# Patient Record
Sex: FEMALE | Race: WHITE | NOT HISPANIC OR LATINO | Employment: FULL TIME | URBAN - METROPOLITAN AREA
[De-identification: names, ages, dates, MRNs, and addresses within clinical notes are randomized per-mention and may not be internally consistent; named-entity substitution may affect disease eponyms.]

---

## 2018-01-11 ENCOUNTER — CONVERSION ENCOUNTER (OUTPATIENT)
Dept: GENERAL RADIOLOGY | Facility: HOSPITAL | Age: 53
End: 2018-01-11

## 2018-05-31 ENCOUNTER — OFFICE VISIT CONVERTED (OUTPATIENT)
Dept: FAMILY MEDICINE CLINIC | Age: 53
End: 2018-05-31
Attending: NURSE PRACTITIONER

## 2019-03-27 ENCOUNTER — HOSPITAL ENCOUNTER (OUTPATIENT)
Dept: GENERAL RADIOLOGY | Facility: HOSPITAL | Age: 54
Discharge: HOME | End: 2019-03-27

## 2019-07-02 ENCOUNTER — HOSPITAL ENCOUNTER (OUTPATIENT)
Dept: URGENT CARE | Facility: CLINIC | Age: 54
Discharge: HOME | End: 2019-07-02

## 2019-07-12 ENCOUNTER — OFFICE VISIT CONVERTED (OUTPATIENT)
Dept: GASTROENTEROLOGY | Facility: CLINIC | Age: 54
End: 2019-07-12
Attending: PHYSICIAN ASSISTANT

## 2019-10-21 ENCOUNTER — OFFICE VISIT CONVERTED (OUTPATIENT)
Dept: GASTROENTEROLOGY | Facility: CLINIC | Age: 54
End: 2019-10-21
Attending: PHYSICIAN ASSISTANT

## 2020-03-09 ENCOUNTER — HOSPITAL ENCOUNTER (OUTPATIENT)
Dept: OTHER | Facility: HOSPITAL | Age: 55
Discharge: HOME | End: 2020-03-09
Attending: OBSTETRICS & GYNECOLOGY

## 2020-03-11 LAB — BACTERIA SPEC AEROBE CULT: NORMAL

## 2020-07-17 ENCOUNTER — HOSPITAL ENCOUNTER (OUTPATIENT)
Dept: GENERAL RADIOLOGY | Facility: HOSPITAL | Age: 55
Discharge: HOME | End: 2020-07-17
Attending: OBSTETRICS & GYNECOLOGY

## 2020-10-21 ENCOUNTER — OFFICE VISIT CONVERTED (OUTPATIENT)
Dept: GASTROENTEROLOGY | Facility: CLINIC | Age: 55
End: 2020-10-21
Attending: NURSE PRACTITIONER

## 2020-12-20 ENCOUNTER — HOSPITAL ENCOUNTER (OUTPATIENT)
Dept: URGENT CARE | Facility: CLINIC | Age: 55
Discharge: HOME | End: 2020-12-20
Attending: FAMILY MEDICINE

## 2021-02-12 ENCOUNTER — HOSPITAL ENCOUNTER (OUTPATIENT)
Dept: OTHER | Facility: HOSPITAL | Age: 56
Discharge: HOME | End: 2021-02-12
Attending: OBSTETRICS & GYNECOLOGY

## 2021-02-15 LAB
AMPICILLIN SUSC ISLT: 4
AMPICILLIN+SULBAC SUSC ISLT: <=2
BACTERIA UR CULT: ABNORMAL
CEFAZOLIN SUSC ISLT: <=4
CEFEPIME SUSC ISLT: <=0.12
CEFTAZIDIME SUSC ISLT: <=1
CEFTRIAXONE SUSC ISLT: <=0.25
CIPROFLOXACIN SUSC ISLT: <=0.25
ERTAPENEM SUSC ISLT: <=0.12
GENTAMICIN SUSC ISLT: <=1
LEVOFLOXACIN SUSC ISLT: <=0.12
NITROFURANTOIN SUSC ISLT: <=16
PIP+TAZO SUSC ISLT: <=4
TMP SMX SUSC ISLT: <=20
TOBRAMYCIN SUSC ISLT: <=1

## 2021-05-13 NOTE — PROGRESS NOTES
Progress Note      Patient Name: Fabiano Piedra   Patient ID: [id number]   Sex: Female   YOB: 1965    Primary Care Provider: Jamilah GARRETT    Visit Date: October 21, 2020    Provider: TAMI Marina   Location: [facility] [place]   Location Address: [address] [place] [address]  BRET Roman  [id number]   Location Phone: [phone]          Chief Complaint  · Follow-up      History Of Present Illness     55-year-old female with a history of GERD and IBS returns for an annual follow-up.  She has failed several PPIs in the past.  She has been maintained on lansoprazole 15 mg daily for her reflux.  Reports good control with lansoprazole.  She reports that her bowel movements are all over the place.  She reports that one week, she can have diarrhea, and the next week, she will have constipation.  She reports here recently, she has been having a lot of abdominal cramping with diarrhea.  She does report occasional nausea with the abdominal cramping.  She is taking dicyclomine twice daily.  Review of her colonoscopy by Dr. Gonzales 12/2013 was unremarkable.  Review of her EGD by Dr. Robles 2015 revealed erosive gastritis and intrinsic stenosis in the esophagus, which was dilated with a 50 Maltese bougie.         Past Medical History  Anxiety; Screening for colon cancer         Past Surgical History  Ablation; Colonoscopy; EGD; Sinus Surgery         Medication List  dicyclomine 20 mg oral tablet; estradiol 0.0375 mg/24 hr transdermal patch semiweekly; lansoprazole 15 mg oral tablet,disintegrat, delay rel; montelukast 10 mg oral tablet         Allergy List  NO KNOWN DRUG ALLERGIES         Family Medical History  Heart Disease; - No Family History of Colorectal Cancer         Social History  Tobacco (Never)         Review of Systems  · Constitutional  o Denies  o : chills, fever  · Cardiovascular  o Denies  o : chest pain  · Respiratory  o Denies  o : shortness of  "breath  · Gastrointestinal  o Admits  o : nausea  o Denies  o : vomiting, dysphagia  · Endocrine  o Denies  o : weight gain, weight loss      Vitals  Date Time BP Position Site L\R Cuff Size HR RR TEMP (F) WT  HT  BMI kg/m2 BSA m2 O2 Sat FR L/min FiO2 HC       10/21/2020 02:31 /78 Sitting    66 - R 16  187lbs 0oz 5'  8\" 28.43 2.02 100 %            Physical Examination  · Constitutional  o Appearance  o : Healthy-appearing, awake and alert in no acute distress  · Head and Face  o Head  o : Normocephalic with no worriesome skin lesions  · Eyes  o Vision  o :   § Visual Fields  § : eyes move symmetrical in all directions  o Sclerae  o : sclerae anicteric  · Neck  o Inspection/Palpation  o : Trachea is midline, no adenopathy  · Respiratory  o Respiratory Effort  o : Breathing is unlabored.  o Inspection of Chest  o : normal appearance  o Auscultation of Lungs  o : Chest is clear to auscultation bilaterally.  · Cardiovascular  o Heart  o :   § Auscultation of Heart  § : no murmurs, rubs, or gallops, RRR  o Peripheral Vascular System  o :   § Extremities  § : no cyanosis, clubbing or edema;   · Gastrointestinal  o Abdominal Examination  o : Abdomen is soft, nontender to palpation, with normal active bowel sounds, no appreciable hepatosplenomegaly.          Assessment  · Gastroesophageal Reflux     530.81/K21.9  · Irritable Bowel Syndrome     564.1/K58.9      Plan  · Medications  o dicyclomine 20 mg oral tablet   SIG: take 1 tablet (20 mg) by oral route 4 times per day for 30 days   DISP: (120) Tablet with 11 refills  Refilled on 10/21/2020     o Medications have been Reconciled  o Transition of Care or Provider Policy  · Instructions  o 55-year-old female with a history of GERD and IBS returns for follow-up. Her reflux is well controlled with lansoprazole 15 mg daily. She reports she has been having a lot more abdominal cramping and diarrhea recently. I have recommended that she take the dicyclomine when the " abdominal cramping begins. I have also discussed and given a handout on a low FODMAP diet to see if that will help improve her symptoms. I would have her follow-up on an annual basis or sooner for any concerns.            Electronically Signed by: TAMI Marina -Author on October 21, 2020 03:28:17 PM  Electronically Co-signed by: Franc Robles MD -Reviewer on November 3, 2020 09:23:26 AM

## 2021-05-14 VITALS
SYSTOLIC BLOOD PRESSURE: 132 MMHG | DIASTOLIC BLOOD PRESSURE: 78 MMHG | OXYGEN SATURATION: 100 % | HEART RATE: 66 BPM | RESPIRATION RATE: 16 BRPM | HEIGHT: 68 IN | WEIGHT: 187 LBS | BODY MASS INDEX: 28.34 KG/M2

## 2021-05-15 VITALS
WEIGHT: 198 LBS | HEART RATE: 68 BPM | OXYGEN SATURATION: 100 % | RESPIRATION RATE: 16 BRPM | HEIGHT: 68 IN | BODY MASS INDEX: 30.01 KG/M2 | SYSTOLIC BLOOD PRESSURE: 109 MMHG | DIASTOLIC BLOOD PRESSURE: 68 MMHG

## 2021-05-15 VITALS
HEART RATE: 68 BPM | OXYGEN SATURATION: 99 % | WEIGHT: 198 LBS | RESPIRATION RATE: 16 BRPM | DIASTOLIC BLOOD PRESSURE: 68 MMHG | SYSTOLIC BLOOD PRESSURE: 137 MMHG

## 2021-05-18 NOTE — PROGRESS NOTES
Fabiano Piedra 1965     Office/Outpatient Visit    Visit Date: Thu, May 31, 2018 03:21 pm    Provider: Jamilah Sandhu N.P. (Assistant: Christine Diane MA)    Location: [facility]        Electronically signed by Jamilah Sandhu N.P. on  05/31/2018 09:25:23 PM                             SUBJECTIVE:        CC:     Ms. Piedra is a 53 year old White female.  presents today due to burning with urination, frequency/pain, Pt is taking azo         HPI:         Burning with urination noted.  This began within the last 4 days.  Associated symptoms include abdominal pain ( intermittent, mild, pressure-like, suprapubic ), urgency and urinary frequency.  She denies associated flank pain or hesitancy.  Ms. Piedra states that she had multiple prior episodes of similar discomfort, which were diagnosed as simple UTI.  did take azo         Additionally, she presents with history of gERD.  dx by GI dr gil.  on dexilant after failure on nexium, prilosec, protonix, and prevacid.  only dexilant helps.  dr gil told her to get further refills from PCP.  EGD done and showed some abnormality.  also takes dicyclomine for prn stomach spasm but does not need diclyclomine refills.  symptoms are stable.      ROS:     CONSTITUTIONAL:  Negative for chills, fatigue, fever, and weight change.      CARDIOVASCULAR:  Negative for chest pain, palpitations, tachycardia, orthopnea, and edema.      RESPIRATORY:  Negative for cough, dyspnea, and hemoptysis.      GASTROINTESTINAL:  Positive for acid reflux symptoms ( stable ).   Negative for abdominal pain, constipation, diarrhea, nausea or vomiting.      GENITOURINARY:  Positive for dysuria and frequent urination.      MUSCULOSKELETAL:  Negative for arthralgias, back pain, and myalgias.      NEUROLOGICAL:  Negative for dizziness, headaches, paresthesias, and weakness.          PMH/FMH/SH:     Past Medical History:             PAST MEDICAL HISTORY         Postive for     stomach spasms;     Positive for    right side bulging disc;     Positive for    overproduction of insulin causing hypoglycemia;     Positive for    precancerous lesions removed;         CURRENT MEDICAL PROVIDERS:    Primary care provider ( Dr. Robins - last seen over 1 year ago )    Allergist: Dr. Flores and Tangela - annually    Dermatologist: Robert    Gastroenterologist: Shonda    Obstetrician/Gynecologist: Cony    Ophthalmologist: Moisés         PREVENTIVE HEALTH MAINTENANCE             BONE DENSITY: has never been done     COLORECTAL CANCER SCREENING: Up to date (colonoscopy q10y; sigmoidoscopy q5y; Cologuard q3y) was last done 2013  [facility], Results are in chart; colonoscopy with normal results     Hepatitis C Medicare Screening: NEVER     MAMMOGRAM: Done within last 2 years and results in are chart was last done 2016 with normal results dense breast tissue     PAP SMEAR: was last done 2016 - Normal         Surgical History:     Other Surgeries Uterine Ablation    Sinus surgery for polyp/deviated septum - age 11;         Family History:     Father:  at age 64; Cause of death was MI;  Coronary Artery Disease; Hypertension; Myocardial Infarction     Mother: Skin Cancer;  GERD;  Dementia     Brother(s): Coronary Artery Disease     Sister(s): Skin Cancer         Social History:     Occupation: [employer]     Marital Status:      Children: 2 children         Tobacco/Alcohol/Supplements:     Last Reviewed on 10/25/2017 01:25 PM by Niya Wayne    Tobacco: She has never smoked.          Alcohol: Frequency: Just on weekends     Caffeine:  She admits to consuming caffeine via -Tea.          Substance Abuse History:     None         Mental Health History:         Generalized Anxiety Disorder      depression         Communicable Diseases (eg STDs):     Reportable health conditions; NEGATIVE             Current Problems:     Allergic rhinitis     IBS      Depression with anxiety     Hypertriglyceridemia     Allergy, unspecified         Immunizations:     Fluzone (3 + years dose) 10/28/2016     Influenza Virus Vaccine pf (adult) 10/27/2017         Allergies:     Last Reviewed on 10/25/2017 01:25 PM by Niya Wayne      No Known Drug Allergies.         Current Medications:     Last Reviewed on 5/31/2018 03:28 PM by Christine Diane    Bacid One tab po qd     Celexa 20mg Tablet 1 tablet daily     Dexilant* 60 mg Once daily     Dicyclomine HCl 20mg Tablet one tab daily prn         OBJECTIVE:        Vitals:         Current: 5/31/2018 3:23:38 PM    Ht:  5 ft, 7.75 in;  Wt: 205.6 lbs;  BMI: 31.5    T: 97.9 F (oral);  BP: 111/65 mm Hg (left arm, sitting);  P: 58 bpm (left arm (BP Cuff), sitting)        Exams:     PHYSICAL EXAM:     GENERAL:  well developed and nourished; appropriately groomed; in no apparent distress;     RESPIRATORY: normal respiratory rate and pattern with no distress; normal breath sounds with no rales, rhonchi, wheezes or rubs;     CARDIOVASCULAR: normal rate; rhythm is regular;     MUSCULOSKELETAL:  Normal range of motion, strength and tone;     NEUROLOGIC: mental status: alert and oriented x 3; GROSSLY INTACT     PSYCHIATRIC:  appropriate affect and demeanor; normal speech pattern; grossly normal memory;         Lab/Test Results:             Glucose, Urine:  100 mg/dL (05/31/2018),     Bilirubin, urine:  Negative (05/31/2018),     Ketones, Urine Strip:  Negative (05/31/2018),     Specific Gravity, urine:  1.020 (05/31/2018),     Blood in Urine:  negative (05/31/2018),     pH, urine:  6.0 (05/31/2018),     Protein Urine QL:  negative (05/31/2018),     Urobilinogen, urine:  1.0 E.U./dL (05/31/2018),     Nitrite, Urine:  Positive (05/31/2018),     Leukoctyes, urine:  Large (05/31/2018),     Appearance:  Clear (05/31/2018),     collection source:  Clean-catch (05/31/2018),     Color:  Orange (05/31/2018),     Performed by::  krystyna (05/31/2018),              ASSESSMENT           788.1   R30.0  Burning with urination              DDx:     530.81   K21.9  GERD              DDx:         ORDERS:         Meds Prescribed:       Bactrim DS (Trimethoprim/Sulfamethoxazole ) Tablet 1 bid  #10 (Ten) tablet(s) Refills: 0       Refill of: Dexilant* 60 mg Once daily  #30 (Thirty) capsule(s) Refills: 0         Lab Orders:       [id number]  Urinalysis, automated, without microscopy  (In-House)         [id number]  [facility] - [facility] Urine Culture  (Send-Out)                   PLAN: dr louise LEBLANC notes          Burning with urination           Prescriptions:       Bactrim DS (Trimethoprim/Sulfamethoxazole ) Tablet 1 bid  #10 (Ten) tablet(s) Refills: 0           Orders:       [id number]  Urinalysis, automated, without microscopy  (In-House)         [id number]  [facility] Urine Culture  (Send-Out)            GERD         needs 30 day supply at local pharmacy and printed 90 day rx as she now has to use [facility] for long term meds.            Prescriptions:       Refill of: Dexilant* 60 mg Once daily  #30 (Thirty) capsule(s) Refills: 0             CHARGE CAPTURE           **Please note: ICD descriptions below are intended for billing purposes only and may not represent clinical diagnoses**        Primary Diagnosis:         788.1 Burning with urination            R30.0    Dysuria              Orders:          [id number]   Office/outpatient visit; established patient, level 3  (In-House)             [id number]   Urinalysis, automated, without microscopy  (In-House)           530.81 GERD            K21.9    Gastro-esophageal reflux disease without esophagitis

## 2021-05-23 ENCOUNTER — TRANSCRIBE ORDERS (OUTPATIENT)
Dept: ADMINISTRATIVE | Facility: HOSPITAL | Age: 56
End: 2021-05-23

## 2021-05-23 DIAGNOSIS — Z12.39 SCREENING BREAST EXAMINATION: Primary | ICD-10-CM

## 2021-07-01 VITALS
HEART RATE: 58 BPM | HEIGHT: 68 IN | WEIGHT: 205.6 LBS | TEMPERATURE: 97.9 F | SYSTOLIC BLOOD PRESSURE: 111 MMHG | DIASTOLIC BLOOD PRESSURE: 65 MMHG | BODY MASS INDEX: 31.16 KG/M2

## 2021-08-04 ENCOUNTER — HOSPITAL ENCOUNTER (OUTPATIENT)
Dept: MAMMOGRAPHY | Facility: HOSPITAL | Age: 56
Discharge: HOME | End: 2021-08-04
Admitting: OBSTETRICS & GYNECOLOGY

## 2021-08-04 DIAGNOSIS — Z12.39 SCREENING BREAST EXAMINATION: ICD-10-CM

## 2021-08-04 PROCEDURE — 77067 SCR MAMMO BI INCL CAD: CPT

## 2021-08-04 PROCEDURE — 77063 BREAST TOMOSYNTHESIS BI: CPT

## 2022-02-07 ENCOUNTER — TELEPHONE (OUTPATIENT)
Dept: GASTROENTEROLOGY | Facility: CLINIC | Age: 57
End: 2022-02-07

## 2022-02-07 DIAGNOSIS — R10.9 ABDOMINAL PAIN, UNSPECIFIED ABDOMINAL LOCATION: Primary | ICD-10-CM

## 2022-02-07 RX ORDER — DICYCLOMINE HYDROCHLORIDE 10 MG/1
20 CAPSULE ORAL
Qty: 240 CAPSULE | Refills: 3 | Status: SHIPPED | OUTPATIENT
Start: 2022-02-07 | End: 2022-03-09

## 2022-02-07 NOTE — TELEPHONE ENCOUNTER
Patient called the office requesting a refill on Dicyclomine due to abdominal cramping. Pt states these episodes are lasting 4-6 hours, and she states she deals with a lot of constipation. Patient states the pain is located in the lower abdomen. Patient still has gall bladder. Pt states she has a lot of food sensitivities as well. Patient states she's tried Linzess in the past but was unable to tolerate. Patient states she has tried Miralax and OTC stool softeners, but states they only cause her to bloat. Pt scheduled to see Nerissa on 4/26/2021. Patient encouraged to keep appointment, and to keep a food log to bring with her to her appt. Pt expressed understanding.

## 2022-04-04 ENCOUNTER — TRANSCRIBE ORDERS (OUTPATIENT)
Dept: ADMINISTRATIVE | Facility: HOSPITAL | Age: 57
End: 2022-04-04

## 2022-04-04 DIAGNOSIS — Z12.31 SCREENING MAMMOGRAM, ENCOUNTER FOR: Primary | ICD-10-CM

## 2022-04-25 NOTE — PROGRESS NOTES
Chief Complaint   Abdominal pain, and constipation     History of Present Illness       Fabiano Piedra is a 57 y.o. female who presents to [facility] for follow-up with a history of reflux, abdominal pain, abdominal bloating, altered bowel habits, food allergies and IBS constipation.  Patient reports just a few months ago that she was having severe lower abdominal cramps that were lasting hours occurring multiple times a day in the evening usually.  Patient reports she was seen by her allergist about a month ago and had a food allergy panel displaying allergy to bananas, dairy and beef products.  Patient reports that she was itching all over if she missed her Zyrtec-D.  She reports she has cut these foods and had improvement in her symptoms with only lower abdominal cramps about twice since that time.  Patient reports since stopping intake of bananas dairy and beef that she has had no reflux and is not on anything for reflux currently.  Her allergist has prescribed Pepcid to be used as needed.  She reports taking 2 Bentyl's in the morning and 2 at night with good control of her abdominal cramps.  She does have constipation and has previously trialed Linzess which caused diarrhea, Metamucil, MiraLAX and probiotics with no relief.  Patient denies fever, nausea, vomiting, weight loss, night sweats, melena, hematochezia, hematemesis.    EGD: Review of the patient's most recent EGD performed by Dr. Robles on 07.17.2015 erosive gastritis and intrinsic stenosis in the esophagus dilated to 50 French Bogie.    Colonoscopy: Review of the patient's most recent colonoscopy performed by Dr. Hillary Sandhu on 12.17.2013 unremarkable.  Results       Result Review :                             Past Medical History       Past Medical History:   Diagnosis Date   • Anxiety    • GERD (gastroesophageal reflux disease)        Past Surgical History:   Procedure Laterality Date   • COLONOSCOPY   "2015   • ENDOSCOPY  2015   • LASER ABLATION  2010   • SINUS SURGERY     • UPPER GASTROINTESTINAL ENDOSCOPY           Current Outpatient Medications:   •  cetirizine (zyrTEC) 5 MG tablet, Take 5 mg by mouth Daily., Disp: , Rfl:   •  dicyclomine (BENTYL) 20 MG tablet, Take 20 mg by mouth Every 6 (Six) Hours., Disp: , Rfl:   •  hyoscyamine (LEVSIN) 0.125 MG SL tablet, Take 1 tablet by mouth 4 (Four) Times a Day With Meals & at Bedtime., Disp: 120 tablet, Rfl: 5  •  Sod Picosulfate-Mag Ox-Cit Acd (Clenpiq) 10-3.5-12 MG-GM -GM/160ML solution, Take 160 mL by mouth 1 (One) Time for 1 dose., Disp: 160 mL, Rfl: 0     No Known Allergies    Family History   Problem Relation Age of Onset   • Heart disease Father    • Heart disease Brother    • Colon cancer Neg Hx         Social History     Social History Narrative   • Not on file       Objective     Vital Signs:   /47 (BP Location: Right arm, Patient Position: Sitting, Cuff Size: Adult)   Pulse 70   Ht 172.7 cm (68\")   Wt 87.3 kg (192 lb 6.4 oz)   SpO2 100%   BMI 29.25 kg/m²       Physical Exam  Constitutional:       General: She is not in acute distress.     Appearance: Normal appearance. She is well-developed and normal weight.   Eyes:      Conjunctiva/sclera: Conjunctivae normal.      Pupils: Pupils are equal, round, and reactive to light.      Visual Fields: Right eye visual fields normal and left eye visual fields normal.   Cardiovascular:      Rate and Rhythm: Normal rate and regular rhythm.      Heart sounds: Normal heart sounds.   Pulmonary:      Effort: Pulmonary effort is normal. No retractions.      Breath sounds: Normal breath sounds and air entry.      Comments: Inspection of chest: normal appearance  Abdominal:      General: Bowel sounds are normal.      Palpations: Abdomen is soft.      Tenderness: There is no abdominal tenderness.      Comments: No appreciable hepatosplenomegaly   Musculoskeletal:      Cervical back: Neck supple.      Right lower leg: " No edema.      Left lower leg: No edema.   Lymphadenopathy:      Cervical: No cervical adenopathy.   Skin:     Findings: No lesion.      Comments: Turgor normal   Neurological:      Mental Status: She is alert and oriented to person, place, and time.   Psychiatric:         Mood and Affect: Mood and affect normal.           Assessment & Plan          Assessment and Plan    Diagnoses and all orders for this visit:    1. Abdominal pain, unspecified abdominal location (Primary)    2. Irritable bowel syndrome with constipation    3. Gastroesophageal reflux disease with esophagitis without hemorrhage    4. Encounter for screening for malignant neoplasm of colon    5. Abdominal bloating    6. Altered bowel habits    7. Constipation, unspecified constipation type    8. Itching    9. Food allergy    Other orders  -     hyoscyamine (LEVSIN) 0.125 MG SL tablet; Take 1 tablet by mouth 4 (Four) Times a Day With Meals & at Bedtime.  Dispense: 120 tablet; Refill: 5  -     Sod Picosulfate-Mag Ox-Cit Acd (Clenpiq) 10-3.5-12 MG-GM -GM/160ML solution; Take 160 mL by mouth 1 (One) Time for 1 dose.  Dispense: 160 mL; Refill: 0         57-year-old female presenting the office today for follow-up with a history of reflux, abdominal pain, abdominal bloating, altered bowel habits, food allergies and IBS constipation.    I have recommended that the patient undergo further evaluation with an EGD and colonoscopy.  I have discussed this procedure in detail with the patient.  I have discussed the risks, benefits and alternatives.  I have discussed the risk of anesthesia, bleeding and perforation.  Patient understands these risks, benefits and alternatives and wishes to proceed.  I will schedule her at her earliest convenience.  We will trial changing her Bentyl to Levsin to see if this improves her bowel movements.  She will add a probiotic back to her regimen.  She will continue with good hydration and fiber intake.  We have discussed smooth  move tea to help support bowel movements.  We will follow-up in the office after endoscopy.  Patient agreeable to this plan will call with any questions or concerns.        Follow Up       Follow Up   Return for Follow up after endoscopy in office.  Patient was given instructions and counseling regarding her condition or for health maintenance advice. Please see specific information pulled into the AVS if appropriate.

## 2022-04-26 ENCOUNTER — OFFICE VISIT (OUTPATIENT)
Dept: GASTROENTEROLOGY | Facility: CLINIC | Age: 57
End: 2022-04-26

## 2022-04-26 ENCOUNTER — PREP FOR SURGERY (OUTPATIENT)
Dept: OTHER | Facility: HOSPITAL | Age: 57
End: 2022-04-26

## 2022-04-26 VITALS
HEART RATE: 70 BPM | DIASTOLIC BLOOD PRESSURE: 47 MMHG | SYSTOLIC BLOOD PRESSURE: 132 MMHG | BODY MASS INDEX: 29.16 KG/M2 | WEIGHT: 192.4 LBS | OXYGEN SATURATION: 100 % | HEIGHT: 68 IN

## 2022-04-26 DIAGNOSIS — K58.1 IRRITABLE BOWEL SYNDROME WITH CONSTIPATION: ICD-10-CM

## 2022-04-26 DIAGNOSIS — R10.9 ABDOMINAL PAIN, UNSPECIFIED ABDOMINAL LOCATION: Primary | ICD-10-CM

## 2022-04-26 DIAGNOSIS — K21.00 GASTROESOPHAGEAL REFLUX DISEASE WITH ESOPHAGITIS WITHOUT HEMORRHAGE: ICD-10-CM

## 2022-04-26 DIAGNOSIS — Z12.11 ENCOUNTER FOR SCREENING FOR MALIGNANT NEOPLASM OF COLON: ICD-10-CM

## 2022-04-26 DIAGNOSIS — R19.4 ALTERED BOWEL HABITS: ICD-10-CM

## 2022-04-26 DIAGNOSIS — K59.00 CONSTIPATION, UNSPECIFIED CONSTIPATION TYPE: ICD-10-CM

## 2022-04-26 DIAGNOSIS — R14.0 ABDOMINAL BLOATING: ICD-10-CM

## 2022-04-26 DIAGNOSIS — L29.9 ITCHING: ICD-10-CM

## 2022-04-26 DIAGNOSIS — Z91.018 FOOD ALLERGY: ICD-10-CM

## 2022-04-26 PROCEDURE — 99214 OFFICE O/P EST MOD 30 MIN: CPT | Performed by: NURSE PRACTITIONER

## 2022-04-26 RX ORDER — DICYCLOMINE HCL 20 MG
20 TABLET ORAL EVERY 6 HOURS
COMMUNITY

## 2022-04-26 RX ORDER — SODIUM PICOSULFATE, MAGNESIUM OXIDE, AND ANHYDROUS CITRIC ACID 10; 3.5; 12 MG/160ML; G/160ML; G/160ML
160 LIQUID ORAL ONCE
Qty: 160 ML | Refills: 0 | Status: SHIPPED | OUTPATIENT
Start: 2022-04-26 | End: 2022-04-26

## 2022-04-26 RX ORDER — CETIRIZINE HYDROCHLORIDE 5 MG/1
5 TABLET ORAL DAILY
COMMUNITY
End: 2022-11-01

## 2022-06-10 ENCOUNTER — TRANSCRIBE ORDERS (OUTPATIENT)
Dept: ADMINISTRATIVE | Facility: HOSPITAL | Age: 57
End: 2022-06-10

## 2022-06-10 DIAGNOSIS — Z78.0 POST-MENOPAUSAL: Primary | ICD-10-CM

## 2022-07-27 ENCOUNTER — TELEPHONE (OUTPATIENT)
Dept: GASTROENTEROLOGY | Facility: CLINIC | Age: 57
End: 2022-07-27

## 2022-07-27 PROBLEM — K59.00 CONSTIPATION: Status: ACTIVE | Noted: 2022-07-27

## 2022-07-27 PROBLEM — L29.9 ITCHING: Status: ACTIVE | Noted: 2022-07-27

## 2022-07-27 PROBLEM — Z12.11 ENCOUNTER FOR SCREENING FOR MALIGNANT NEOPLASM OF COLON: Status: ACTIVE | Noted: 2022-07-27

## 2022-07-27 PROBLEM — R10.9 ABDOMINAL PAIN: Status: ACTIVE | Noted: 2022-07-27

## 2022-07-27 PROBLEM — K21.00 GASTROESOPHAGEAL REFLUX DISEASE WITH ESOPHAGITIS WITHOUT HEMORRHAGE: Status: ACTIVE | Noted: 2022-07-27

## 2022-07-27 PROBLEM — Z91.018 FOOD ALLERGY: Status: ACTIVE | Noted: 2022-07-27

## 2022-07-27 PROBLEM — K58.1 IRRITABLE BOWEL SYNDROME WITH CONSTIPATION: Status: ACTIVE | Noted: 2022-07-27

## 2022-07-27 PROBLEM — R19.4 ALTERED BOWEL HABITS: Status: ACTIVE | Noted: 2022-07-27

## 2022-07-27 PROBLEM — R14.0 ABDOMINAL BLOATING: Status: ACTIVE | Noted: 2022-07-27

## 2022-07-27 NOTE — TELEPHONE ENCOUNTER
Called patient multiple times to resched scope originally scheduled on 07/29/22 as Dr Robles will not be scoping that day.  I spoke with  on 07/26/22 (he is on verbal release). I left a message with him as well.

## 2022-08-01 ENCOUNTER — HOSPITAL ENCOUNTER (OUTPATIENT)
Facility: HOSPITAL | Age: 57
Setting detail: HOSPITAL OUTPATIENT SURGERY
Discharge: HOME | End: 2022-08-01
Attending: INTERNAL MEDICINE | Admitting: INTERNAL MEDICINE

## 2022-08-01 ENCOUNTER — ANESTHESIA (OUTPATIENT)
Dept: GASTROENTEROLOGY | Facility: HOSPITAL | Age: 57
End: 2022-08-01

## 2022-08-01 ENCOUNTER — ANESTHESIA EVENT (OUTPATIENT)
Dept: GASTROENTEROLOGY | Facility: HOSPITAL | Age: 57
End: 2022-08-01

## 2022-08-01 VITALS
WEIGHT: 188.27 LBS | SYSTOLIC BLOOD PRESSURE: 105 MMHG | HEIGHT: 68 IN | HEART RATE: 63 BPM | DIASTOLIC BLOOD PRESSURE: 68 MMHG | BODY MASS INDEX: 28.53 KG/M2 | OXYGEN SATURATION: 99 % | RESPIRATION RATE: 14 BRPM | TEMPERATURE: 97.4 F

## 2022-08-01 DIAGNOSIS — R10.9 ABDOMINAL PAIN, UNSPECIFIED ABDOMINAL LOCATION: ICD-10-CM

## 2022-08-01 DIAGNOSIS — K59.00 CONSTIPATION, UNSPECIFIED CONSTIPATION TYPE: ICD-10-CM

## 2022-08-01 DIAGNOSIS — L29.9 ITCHING: ICD-10-CM

## 2022-08-01 DIAGNOSIS — R14.0 ABDOMINAL BLOATING: ICD-10-CM

## 2022-08-01 DIAGNOSIS — K58.1 IRRITABLE BOWEL SYNDROME WITH CONSTIPATION: ICD-10-CM

## 2022-08-01 DIAGNOSIS — R19.4 ALTERED BOWEL HABITS: ICD-10-CM

## 2022-08-01 DIAGNOSIS — Z12.11 ENCOUNTER FOR SCREENING FOR MALIGNANT NEOPLASM OF COLON: ICD-10-CM

## 2022-08-01 DIAGNOSIS — K21.00 GASTROESOPHAGEAL REFLUX DISEASE WITH ESOPHAGITIS WITHOUT HEMORRHAGE: ICD-10-CM

## 2022-08-01 DIAGNOSIS — Z91.018 FOOD ALLERGY: ICD-10-CM

## 2022-08-01 PROCEDURE — 88305 TISSUE EXAM BY PATHOLOGIST: CPT | Performed by: INTERNAL MEDICINE

## 2022-08-01 PROCEDURE — 45380 COLONOSCOPY AND BIOPSY: CPT | Performed by: INTERNAL MEDICINE

## 2022-08-01 PROCEDURE — 25010000002 PROPOFOL 10 MG/ML EMULSION: Performed by: NURSE ANESTHETIST, CERTIFIED REGISTERED

## 2022-08-01 PROCEDURE — 43239 EGD BIOPSY SINGLE/MULTIPLE: CPT | Performed by: INTERNAL MEDICINE

## 2022-08-01 RX ORDER — SODIUM CHLORIDE, SODIUM LACTATE, POTASSIUM CHLORIDE, CALCIUM CHLORIDE 600; 310; 30; 20 MG/100ML; MG/100ML; MG/100ML; MG/100ML
30 INJECTION, SOLUTION INTRAVENOUS CONTINUOUS
Status: DISCONTINUED | OUTPATIENT
Start: 2022-08-01 | End: 2022-08-01 | Stop reason: HOSPADM

## 2022-08-01 RX ORDER — LIDOCAINE HYDROCHLORIDE 20 MG/ML
INJECTION, SOLUTION EPIDURAL; INFILTRATION; INTRACAUDAL; PERINEURAL AS NEEDED
Status: DISCONTINUED | OUTPATIENT
Start: 2022-08-01 | End: 2022-08-01 | Stop reason: SURG

## 2022-08-01 RX ADMIN — SODIUM CHLORIDE, POTASSIUM CHLORIDE, SODIUM LACTATE AND CALCIUM CHLORIDE 30 ML/HR: 600; 310; 30; 20 INJECTION, SOLUTION INTRAVENOUS at 11:15

## 2022-08-01 RX ADMIN — PROPOFOL 250 MCG/KG/MIN: 10 INJECTION, EMULSION INTRAVENOUS at 12:39

## 2022-08-01 RX ADMIN — LIDOCAINE HYDROCHLORIDE 40 MG: 20 INJECTION, SOLUTION EPIDURAL; INFILTRATION; INTRACAUDAL; PERINEURAL at 12:39

## 2022-08-01 NOTE — H&P
"Pre Procedure History & Physical    Chief Complaint:   Altered bowel habits  gerd  Generalized abdominal pain    Subjective     HPI:   As above    Past Medical History:   Past Medical History:   Diagnosis Date   • Anxiety    • GERD (gastroesophageal reflux disease)        Past Surgical History:  Past Surgical History:   Procedure Laterality Date   • COLONOSCOPY  2015   • ENDOSCOPY  2015   • LASER ABLATION  2010   • SINUS SURGERY     • UPPER GASTROINTESTINAL ENDOSCOPY         Family History:  Family History   Problem Relation Age of Onset   • Heart disease Father    • Heart disease Brother    • Colon cancer Neg Hx    • Malig Hyperthermia Neg Hx        Social History:   reports that she is a non-smoker but has been exposed to tobacco smoke. She has never used smokeless tobacco. She reports current alcohol use. She reports that she does not use drugs.    Medications:   Medications Prior to Admission   Medication Sig Dispense Refill Last Dose   • cetirizine (zyrTEC) 5 MG tablet Take 5 mg by mouth Daily.   7/31/2022 at Unknown time   • dicyclomine (BENTYL) 20 MG tablet Take 20 mg by mouth Every 6 (Six) Hours.   7/30/2022       Allergies:  Patient has no known allergies.        Objective     Blood pressure 113/58, pulse 71, temperature 97.7 °F (36.5 °C), temperature source Temporal, resp. rate 16, height 172.8 cm (68.03\"), weight 85.4 kg (188 lb 4.4 oz), SpO2 96 %.    Physical Exam   Constitutional: Pt is oriented to person, place, and time and well-developed, well-nourished, and in no distress.   Mouth/Throat: Oropharynx is clear and moist.   Neck: Normal range of motion.   Cardiovascular: Normal rate, regular rhythm and normal heart sounds.    Pulmonary/Chest: Effort normal and breath sounds normal.   Abdominal: Soft. Nontender  Skin: Skin is warm and dry.   Psychiatric: Mood, memory, affect and judgment normal.     Assessment & Plan     Diagnosis:  Altered bowel habits  gerd  Generalized abdominal pain      Anticipated " Surgical Procedure:  egd  colonoscopy    The risks, benefits, and alternatives of this procedure have been discussed with the patient or the responsible party- the patient understands and agrees to proceed.

## 2022-08-01 NOTE — ANESTHESIA POSTPROCEDURE EVALUATION
Patient: Fabiano Piedra    Procedure Summary     Date: 08/01/22 Room / Location: [facility] ENDOSCOPY 2 / [facility] ENDOSCOPY    Anesthesia Start: 1239 Anesthesia Stop: 1308    Procedures:       ESOPHAGOGASTRODUODENOSCOPY WITH BX (N/A )      COLONOSCOPY WITH POLYPECTOMY (N/A ) Diagnosis:       Abdominal pain, unspecified abdominal location      Irritable bowel syndrome with constipation      Gastroesophageal reflux disease with esophagitis without hemorrhage      Encounter for screening for malignant neoplasm of colon      Abdominal bloating      Altered bowel habits      Constipation, unspecified constipation type      Itching      Food allergy      (Abdominal pain, unspecified abdominal location [R10.9])      (Irritable bowel syndrome with constipation [K58.1])      (Gastroesophageal reflux disease with esophagitis without hemorrhage [K21.00])      (Encounter for screening for malignant neoplasm of colon [Z12.11])      (Abdominal bloating [R14.0])      (Altered bowel habits [R19.4])      (Constipation, unspecified constipation type [K59.00])      (Itching [L29.9])      (Food allergy [Z91.018])    Surgeons: Franc Robles MD Provider: Bigg Angel MD    Anesthesia Type: general ASA Status: 2          Anesthesia Type: general    Vitals  Vitals Value Taken Time   /68 08/01/22 1321   Temp 36.3 °C (97.4 °F) 08/01/22 1305   Pulse 59 08/01/22 1323   Resp 14 08/01/22 1320   SpO2 99 % 08/01/22 1323   Vitals shown include unvalidated device data.        Post Anesthesia Care and Evaluation    Patient location during evaluation: bedside  Patient participation: complete - patient participated  Level of consciousness: awake  Pain management: adequate    Airway patency: patent  Anesthetic complications: No anesthetic complications  PONV Status: none  Cardiovascular status: acceptable and stable  Respiratory status: acceptable  Hydration status: acceptable    Comments: An Anesthesiologist personally  participated in the most demanding procedures (including induction and emergence if applicable) in the anesthesia plan, monitored the course of anesthesia administration at frequent intervals and remained physically present and available for immediate diagnosis and treatment of emergencies.

## 2022-08-01 NOTE — ANESTHESIA PREPROCEDURE EVALUATION
Anesthesia Evaluation     Patient summary reviewed and Nursing notes reviewed   no history of anesthetic complications:  NPO Solid Status: > 8 hours  NPO Liquid Status: > 2 hours           Airway   Mallampati: III  TM distance: >3 FB  Neck ROM: full  Possible difficult intubation  Dental      Pulmonary - negative pulmonary ROS and normal exam    breath sounds clear to auscultation  Cardiovascular - negative cardio ROS and normal exam  Exercise tolerance: good (4-7 METS)    Rhythm: regular  Rate: normal        Neuro/Psych- negative ROS  GI/Hepatic/Renal/Endo    (+)  GERD,      Musculoskeletal (-) negative ROS    Abdominal    Substance History - negative use     OB/GYN negative ob/gyn ROS         Other - negative ROS                       Anesthesia Plan    ASA 2     general       Anesthetic plan, risks, benefits, and alternatives have been provided, discussed and informed consent has been obtained with: patient and spouse/significant other.        CODE STATUS:

## 2022-08-02 LAB
CYTO UR: NORMAL
LAB AP CASE REPORT: NORMAL
LAB AP CLINICAL INFORMATION: NORMAL
PATH REPORT.FINAL DX SPEC: NORMAL
PATH REPORT.GROSS SPEC: NORMAL

## 2022-08-03 ENCOUNTER — TELEPHONE (OUTPATIENT)
Dept: GASTROENTEROLOGY | Facility: CLINIC | Age: 57
End: 2022-08-03

## 2022-08-03 NOTE — TELEPHONE ENCOUNTER
Pt placed in colon recall ----- Message from TAMI Alfred sent at 8/2/2022 10:38 AM EDT -----  I have reviewed the patient colonoscopy and pathology report. Patient has tubular adenoma polyp(s) on pathology report that are smaller than 10mm in size. It is recommended the patient be on a 5 year recall. Please place in the recall system.     I have reviewed upper endoscopy. Negative results for H. Pylori, metaplasia, dysplasia and malignancy.

## 2022-08-05 ENCOUNTER — HOSPITAL ENCOUNTER (OUTPATIENT)
Dept: MAMMOGRAPHY | Facility: HOSPITAL | Age: 57
Discharge: HOME | End: 2022-08-05
Admitting: OBSTETRICS & GYNECOLOGY

## 2022-08-05 DIAGNOSIS — Z12.31 SCREENING MAMMOGRAM, ENCOUNTER FOR: ICD-10-CM

## 2022-08-05 PROCEDURE — 77067 SCR MAMMO BI INCL CAD: CPT

## 2022-08-05 PROCEDURE — 77063 BREAST TOMOSYNTHESIS BI: CPT

## 2022-08-09 ENCOUNTER — LAB (OUTPATIENT)
Dept: LAB | Facility: HOSPITAL | Age: 57
End: 2022-08-09

## 2022-08-09 ENCOUNTER — TRANSCRIBE ORDERS (OUTPATIENT)
Dept: LAB | Facility: HOSPITAL | Age: 57
End: 2022-08-09

## 2022-08-09 DIAGNOSIS — R10.9 ABDOMINAL PAIN, UNSPECIFIED ABDOMINAL LOCATION: Primary | ICD-10-CM

## 2022-08-09 DIAGNOSIS — R10.9 ABDOMINAL PAIN, UNSPECIFIED ABDOMINAL LOCATION: ICD-10-CM

## 2022-08-09 PROCEDURE — 86003 ALLG SPEC IGE CRUDE XTRC EA: CPT

## 2022-08-09 PROCEDURE — 36415 COLL VENOUS BLD VENIPUNCTURE: CPT

## 2022-08-09 PROCEDURE — 86008 ALLG SPEC IGE RECOMB EA: CPT

## 2022-08-10 LAB
BEEF IGE QN: <0.1 KU/L
LAMB IGE QN: <0.1 KU/L
PORK IGE QN: <0.1 KU/L

## 2022-08-12 LAB
Lab: 0
VENISON IGE QN: <0.35 KU/L

## 2022-08-13 LAB — ALPHA-GAL IGE QN: 0.2 KU/L

## 2022-08-22 ENCOUNTER — HOSPITAL ENCOUNTER (OUTPATIENT)
Dept: BONE DENSITY | Facility: HOSPITAL | Age: 57
Discharge: HOME | End: 2022-08-22
Admitting: OBSTETRICS & GYNECOLOGY

## 2022-08-22 DIAGNOSIS — Z78.0 POST-MENOPAUSAL: ICD-10-CM

## 2022-08-22 PROCEDURE — 77080 DXA BONE DENSITY AXIAL: CPT

## 2022-11-01 ENCOUNTER — OFFICE VISIT (OUTPATIENT)
Dept: GASTROENTEROLOGY | Facility: CLINIC | Age: 57
End: 2022-11-01

## 2022-11-01 VITALS
SYSTOLIC BLOOD PRESSURE: 91 MMHG | BODY MASS INDEX: 30.2 KG/M2 | HEART RATE: 68 BPM | OXYGEN SATURATION: 100 % | HEIGHT: 68 IN | DIASTOLIC BLOOD PRESSURE: 77 MMHG | WEIGHT: 199.3 LBS

## 2022-11-01 DIAGNOSIS — Z86.010 HISTORY OF COLON POLYPS: ICD-10-CM

## 2022-11-01 DIAGNOSIS — K58.1 IRRITABLE BOWEL SYNDROME WITH CONSTIPATION: Primary | ICD-10-CM

## 2022-11-01 PROCEDURE — 99213 OFFICE O/P EST LOW 20 MIN: CPT

## 2022-11-01 RX ORDER — CETIRIZINE HYDROCHLORIDE, PSEUDOEPHEDRINE HYDROCHLORIDE 5; 120 MG/1; MG/1
TABLET, FILM COATED, EXTENDED RELEASE ORAL
COMMUNITY
Start: 2022-08-09

## 2022-11-01 RX ORDER — BROMPHENIRAMINE MALEATE, PSEUDOEPHEDRINE HYDROCHLORIDE, AND DEXTROMETHORPHAN HYDROBROMIDE 2; 30; 10 MG/5ML; MG/5ML; MG/5ML
SYRUP ORAL
COMMUNITY
Start: 2022-10-04

## 2022-11-01 RX ORDER — METHYLPREDNISOLONE 4 MG/1
TABLET ORAL
COMMUNITY
Start: 2022-10-04

## 2022-11-01 RX ORDER — AMOXICILLIN AND CLAVULANATE POTASSIUM 875; 125 MG/1; MG/1
TABLET, FILM COATED ORAL
COMMUNITY
Start: 2022-10-03

## 2023-05-19 ENCOUNTER — HOSPITAL ENCOUNTER (OUTPATIENT)
Dept: GENERAL RADIOLOGY | Facility: HOSPITAL | Age: 58
Discharge: HOME | End: 2023-05-19
Payer: COMMERCIAL

## 2023-05-19 ENCOUNTER — TELEPHONE (OUTPATIENT)
Dept: GASTROENTEROLOGY | Facility: CLINIC | Age: 58
End: 2023-05-19
Payer: COMMERCIAL

## 2023-05-19 DIAGNOSIS — K59.00 CONSTIPATION, UNSPECIFIED CONSTIPATION TYPE: ICD-10-CM

## 2023-05-19 DIAGNOSIS — K59.00 CONSTIPATION, UNSPECIFIED CONSTIPATION TYPE: Primary | ICD-10-CM

## 2023-05-19 PROCEDURE — 74018 RADEX ABDOMEN 1 VIEW: CPT

## 2023-05-19 RX ORDER — PLECANATIDE 3 MG/1
3 TABLET ORAL DAILY
Qty: 30 TABLET | Refills: 3 | Status: SHIPPED | OUTPATIENT
Start: 2023-05-19

## 2023-05-19 NOTE — TELEPHONE ENCOUNTER
Pt called in having abd. Pain. And cramping. BM every 2 weeks. Wants to know what she can take to help with this Nerissa GARRETT will send in trulance and wants pt to have an XR of the Abdomen. Will place these orders.

## 2023-05-22 ENCOUNTER — TELEPHONE (OUTPATIENT)
Dept: GASTROENTEROLOGY | Facility: CLINIC | Age: 58
End: 2023-05-22
Payer: COMMERCIAL

## 2023-05-22 NOTE — TELEPHONE ENCOUNTER
"Patient called the answering service over the weekend complaining of abdominal pain and constipation. I spoke with patient. She has not picked up the Trulance due to requiring a prior authorization. Per ROGELIO Wick, this has been approved. I advised patient to  medication and hydrate well. Patient agreed to this plan. Patient aware that her xray results will be reviewed tomorrow when Nerissa is in office. Patient states she had a bowel movement yesterday but felt \"sick\" all day Saturday. Patient states she is feeling somewhat better. Patient aware to maintain appointment and she is on the cancellation list.   "

## 2023-08-30 ENCOUNTER — OFFICE VISIT (OUTPATIENT)
Dept: INTERNAL MEDICINE | Facility: CLINIC | Age: 58
End: 2023-08-30
Payer: COMMERCIAL

## 2023-08-30 VITALS
BODY MASS INDEX: 28.64 KG/M2 | DIASTOLIC BLOOD PRESSURE: 68 MMHG | HEIGHT: 68 IN | OXYGEN SATURATION: 99 % | HEART RATE: 80 BPM | TEMPERATURE: 97.8 F | SYSTOLIC BLOOD PRESSURE: 117 MMHG | WEIGHT: 189 LBS

## 2023-08-30 DIAGNOSIS — Z00.00 ANNUAL PHYSICAL EXAM: ICD-10-CM

## 2023-08-30 DIAGNOSIS — Z13.220 SCREENING FOR LIPID DISORDERS: ICD-10-CM

## 2023-08-30 DIAGNOSIS — E66.3 OVERWEIGHT (BMI 25.0-29.9): ICD-10-CM

## 2023-08-30 DIAGNOSIS — Z00.00 ENCOUNTER FOR MEDICAL EXAMINATION TO ESTABLISH CARE: Primary | ICD-10-CM

## 2023-08-30 DIAGNOSIS — K58.1 IRRITABLE BOWEL SYNDROME WITH CONSTIPATION: ICD-10-CM

## 2023-08-30 DIAGNOSIS — Z11.59 NEED FOR HEPATITIS C SCREENING TEST: ICD-10-CM

## 2023-08-30 DIAGNOSIS — K21.00 GASTROESOPHAGEAL REFLUX DISEASE WITH ESOPHAGITIS WITHOUT HEMORRHAGE: Chronic | ICD-10-CM

## 2023-08-30 DIAGNOSIS — R19.4 ALTERED BOWEL HABITS: ICD-10-CM

## 2023-08-30 DIAGNOSIS — M16.0 OSTEOARTHRITIS OF BOTH HIPS, UNSPECIFIED OSTEOARTHRITIS TYPE: ICD-10-CM

## 2023-08-30 DIAGNOSIS — M25.552 BILATERAL HIP PAIN: ICD-10-CM

## 2023-08-30 DIAGNOSIS — M25.551 BILATERAL HIP PAIN: ICD-10-CM

## 2023-08-30 DIAGNOSIS — J30.9 ALLERGIC RHINITIS, UNSPECIFIED SEASONALITY, UNSPECIFIED TRIGGER: ICD-10-CM

## 2023-08-30 DIAGNOSIS — R73.01 IFG (IMPAIRED FASTING GLUCOSE): ICD-10-CM

## 2023-08-30 LAB
ALBUMIN SERPL-MCNC: 4.7 G/DL (ref 3.5–5.2)
ALBUMIN/GLOB SERPL: 1.7 G/DL
ALP SERPL-CCNC: 87 U/L (ref 39–117)
ALT SERPL W P-5'-P-CCNC: 17 U/L (ref 1–33)
ANION GAP SERPL CALCULATED.3IONS-SCNC: 10.9 MMOL/L (ref 5–15)
AST SERPL-CCNC: 20 U/L (ref 1–32)
BASOPHILS # BLD AUTO: 0.04 10*3/MM3 (ref 0–0.2)
BASOPHILS NFR BLD AUTO: 0.6 % (ref 0–1.5)
BILIRUB SERPL-MCNC: 0.4 MG/DL (ref 0–1.2)
BUN SERPL-MCNC: 21 MG/DL (ref 8–23)
BUN/CREAT SERPL: 22.6 (ref 7–25)
CALCIUM SPEC-SCNC: 10 MG/DL (ref 8.6–10.5)
CHLORIDE SERPL-SCNC: 101 MMOL/L (ref 98–107)
CHOLEST SERPL-MCNC: 225 MG/DL (ref 0–200)
CO2 SERPL-SCNC: 30.1 MMOL/L (ref 22–29)
CREAT SERPL-MCNC: 0.93 MG/DL (ref 0.57–1)
DEPRECATED RDW RBC AUTO: 40.7 FL (ref 37–54)
EGFRCR SERPLBLD CKD-EPI 2021: 70.5 ML/MIN/1.73
EOSINOPHIL # BLD AUTO: 0.05 10*3/MM3 (ref 0–0.4)
EOSINOPHIL NFR BLD AUTO: 0.7 % (ref 0.3–6.2)
ERYTHROCYTE [DISTWIDTH] IN BLOOD BY AUTOMATED COUNT: 12.5 % (ref 12.3–15.4)
GLOBULIN UR ELPH-MCNC: 2.7 GM/DL
GLUCOSE SERPL-MCNC: 100 MG/DL (ref 65–99)
HBA1C MFR BLD: 5.6 % (ref 4.8–5.6)
HCT VFR BLD AUTO: 42.4 % (ref 34–46.6)
HDLC SERPL-MCNC: 94 MG/DL (ref 40–60)
HGB BLD-MCNC: 14.2 G/DL (ref 12–15.9)
IMM GRANULOCYTES # BLD AUTO: 0.02 10*3/MM3 (ref 0–0.05)
IMM GRANULOCYTES NFR BLD AUTO: 0.3 % (ref 0–0.5)
LDLC SERPL CALC-MCNC: 124 MG/DL (ref 0–100)
LDLC/HDLC SERPL: 1.31 {RATIO}
LYMPHOCYTES # BLD AUTO: 1.57 10*3/MM3 (ref 0.7–3.1)
LYMPHOCYTES NFR BLD AUTO: 22.2 % (ref 19.6–45.3)
MCH RBC QN AUTO: 29.8 PG (ref 26.6–33)
MCHC RBC AUTO-ENTMCNC: 33.5 G/DL (ref 31.5–35.7)
MCV RBC AUTO: 88.9 FL (ref 79–97)
MONOCYTES # BLD AUTO: 0.44 10*3/MM3 (ref 0.1–0.9)
MONOCYTES NFR BLD AUTO: 6.2 % (ref 5–12)
NEUTROPHILS NFR BLD AUTO: 4.96 10*3/MM3 (ref 1.7–7)
NEUTROPHILS NFR BLD AUTO: 70 % (ref 42.7–76)
NRBC BLD AUTO-RTO: 0 /100 WBC (ref 0–0.2)
PLATELET # BLD AUTO: 342 10*3/MM3 (ref 140–450)
PMV BLD AUTO: 9.8 FL (ref 6–12)
POTASSIUM SERPL-SCNC: 4 MMOL/L (ref 3.5–5.2)
PROT SERPL-MCNC: 7.4 G/DL (ref 6–8.5)
RBC # BLD AUTO: 4.77 10*6/MM3 (ref 3.77–5.28)
SODIUM SERPL-SCNC: 142 MMOL/L (ref 136–145)
T4 FREE SERPL-MCNC: 1.41 NG/DL (ref 0.93–1.7)
TRIGL SERPL-MCNC: 41 MG/DL (ref 0–150)
TSH SERPL DL<=0.05 MIU/L-ACNC: 5.09 UIU/ML (ref 0.27–4.2)
VLDLC SERPL-MCNC: 7 MG/DL (ref 5–40)
WBC NRBC COR # BLD: 7.08 10*3/MM3 (ref 3.4–10.8)

## 2023-08-30 PROCEDURE — 80050 GENERAL HEALTH PANEL: CPT | Performed by: NURSE PRACTITIONER

## 2023-08-30 PROCEDURE — 83036 HEMOGLOBIN GLYCOSYLATED A1C: CPT | Performed by: NURSE PRACTITIONER

## 2023-08-30 PROCEDURE — 86803 HEPATITIS C AB TEST: CPT | Performed by: NURSE PRACTITIONER

## 2023-08-30 PROCEDURE — 84439 ASSAY OF FREE THYROXINE: CPT | Performed by: NURSE PRACTITIONER

## 2023-08-30 PROCEDURE — 80061 LIPID PANEL: CPT | Performed by: NURSE PRACTITIONER

## 2023-08-30 NOTE — PROGRESS NOTES
"Chief Complaint  Establish Care and Annual Exam    Subjective          Fabiano Piedra presents to [facility]  History of Present Illness    Previous PCP: Dr. Robins   Specialist(s): Gastroenterology (Dr. Hahn), Dr. Donnelly (OB/GYN), and Allergist  COVID vaccine: completed, not including booster  Pneumonia vaccine: never  Shingles vaccine: never  Colon cancer screenin2022  Mammogram: scheduled for 10/19/23  Pap Smear: around  or May of 2023    Ablation/ menopausal May 2009     Beef dairy and bananas allergy sees allergist at Dr. Honeycutt's office and sees Nerissa Vee NP with gastro.       Has bulging disc in her lower back   Has been in PT   Hip pain bilaterally, worsening.       Current Outpatient Medications   Medication Instructions    cetirizine-pseudoephedrine (ZyrTEC-D) 5-120 MG per 12 hr tablet No dose, route, or frequency recorded.    Diclofenac Sodium (VOLTAREN) 4 g, Topical, 4 Times Daily PRN    dicyclomine (BENTYL) 10 MG capsule TAKE TWO CAPSULES BY MOUTH FOUR TIMES A DAY BEFORE MEALS AND AT BEDTIME FOR 30 DAYS       The following portions of the patient's history were reviewed and updated as appropriate: allergies, current medications, past family history, past medical history, past social history, past surgical history, and problem list.    Objective   Vital Signs:   /68 (BP Location: Left arm, Patient Position: Sitting, Cuff Size: Adult)   Pulse 80   Temp 97.8 øF (36.6 øC) (Temporal)   Ht 172.7 cm (68\")   Wt 85.7 kg (189 lb)   SpO2 99%   BMI 28.74 kg/mý     BP Readings from Last 3 Encounters:   23 117/68   23 114/47   23 127/66     Wt Readings from Last 3 Encounters:   23 85.7 kg (189 lb)   23 84.4 kg (186 lb)   23 87.5 kg (193 lb)     BMI is >= 25 and <30. (Overweight) The following options were offered after discussion;: weight loss educational material (shared in after visit summary), " exercise counseling/recommendations, and nutrition counseling/recommendations    Physical Exam  Constitutional:       Appearance: She is overweight.        Appearance: No acute distress, well-nourished  Head: normocephalic, atraumatic  Eyes: extraocular movements intact, no scleral icterus, no conjunctival injection  Ears, Nose, and Throat: external ears normal, nares patent, moist mucous membranes  Cardiovascular: regular rate and rhythm. no murmurs, rubs, or gallops. no edema  Respiratory: breathing comfortably, symmetric chest rise, clear to auscultation bilaterally. No wheezes, rales, or rhonchi.  Neuro: alert and oriented to time, place, and person. Normal gait  Psych: normal mood and affect     Result Review :   The following data was reviewed by: TAMI Morelos on 08/30/2023:  Common labs          8/30/2023    16:16   Common Labs   Glucose 100    BUN 21    Creatinine 0.93    Sodium 142    Potassium 4.0    Chloride 101    Calcium 10.0    Albumin 4.7    Total Bilirubin 0.4    Alkaline Phosphatase 87    AST (SGOT) 20    ALT (SGPT) 17    WBC 7.08    Hemoglobin 14.2    Hematocrit 42.4    Platelets 342    Total Cholesterol 225    Triglycerides 41    HDL Cholesterol 94    LDL Cholesterol  124    Hemoglobin A1C 5.60             No results found for: SARSANTIGEN, COVID19, RAPFLUA, RAPFLUB, FLUAAG, FLUABDAG, FLUABDAG, FLU, FLU, FLUBAG, RAPSCRN, STREPAAG, RSV, POCPREGUR, MONOSPOT, INR, LEADCAPBLD, POCLEAD, BILIRUBINUR    Procedures        Assessment and Plan    Diagnoses and all orders for this visit:    1. Encounter for medical examination to establish care (Primary)  -     TSH Rfx On Abnormal To Free T4  -     Comprehensive Metabolic Panel  -     CBC & Differential  -     T4, Free    2. Altered bowel habits  Overview:  Added automatically from request for surgery [id number]      3. Gastroesophageal reflux disease with esophagitis without hemorrhage  Comments:  well controlled  Overview:  Added automatically  from request for surgery [id number]      4. Irritable bowel syndrome with constipation  Overview:  Added automatically from request for surgery [id number]    Orders:  -     TSH Rfx On Abnormal To Free T4  -     Comprehensive Metabolic Panel  -     CBC & Differential  -     T4, Free    5. Screening for lipid disorders  -     Lipid Panel    6. Need for hepatitis C screening test  -     HCV Antibody Rfx To Qnt PCR  -     Interpretation:    7. Allergic rhinitis, unspecified seasonality, unspecified trigger    8. Overweight (BMI 25.0-29.9)  Comments:  diet and exercise counseling    9. IFG (impaired fasting glucose)  Comments:  will check A1C  Orders:  -     Hemoglobin A1c    10. Bilateral hip pain    11. Osteoarthritis of both hips, unspecified osteoarthritis type  Comments:  trial diclofenac gel  Orders:  -     Diclofenac Sodium (VOLTAREN) 1 % gel gel; Apply 4 g topically to the appropriate area as directed 4 (Four) Times a Day As Needed (pain).  Dispense: 350 g; Refill: 0    12. Annual physical exam      Advised on diet, physical activity, sunscreen, helmet, texting and driving, etc      Medications Discontinued During This Encounter   Medication Reason    brompheniramine-pseudoephedrine-DM 30-2-10 MG/5ML syrup *Therapy completed    Plecanatide (Trulance) 3 MG tablet *Therapy completed          Follow Up   Return in about 1 year (around 8/30/2024) for Annual physical.  Patient was given instructions and counseling regarding her condition or for health maintenance advice. Please see specific information pulled into the AVS if appropriate.       Swapna Sandra, TAMI  09/01/23  08:18 EDT

## 2023-09-01 LAB
HCV AB SERPL QL IA: NORMAL
HCV IGG SERPL QL IA: NON REACTIVE

## 2023-09-08 ENCOUNTER — TELEPHONE (OUTPATIENT)
Dept: INTERNAL MEDICINE | Facility: CLINIC | Age: 58
End: 2023-09-08
Payer: COMMERCIAL

## 2023-09-08 NOTE — TELEPHONE ENCOUNTER
----- Message from TAMI Morelos sent at 9/6/2023 11:01 AM EDT -----  TSH elevated. T4 normal. This is what we call subclinical hypothyroidism. We do not treat unless the TSH is greater than 10. I would like to see a repeat TSH in 3 months if she is not opposed.     Cholesterol levels elevated. Monitor trans and saturated fats and increase physical activity.     The 10-year ASCVD risk score (Gio DK, et al., 2019) is: 2.1%    Other labs reassuring.   
Relayed results to patient. Patient verbalized understanding. There were no further questions or concerns that were noted during telephone encounter.    
Warm/Dry

## 2023-10-19 ENCOUNTER — HOSPITAL ENCOUNTER (OUTPATIENT)
Dept: MAMMOGRAPHY | Facility: HOSPITAL | Age: 58
Discharge: HOME | End: 2023-10-19
Admitting: OBSTETRICS & GYNECOLOGY
Payer: COMMERCIAL

## 2023-10-19 DIAGNOSIS — Z12.31 SCREENING MAMMOGRAM, ENCOUNTER FOR: ICD-10-CM

## 2023-10-19 PROCEDURE — 77067 SCR MAMMO BI INCL CAD: CPT

## 2023-10-19 PROCEDURE — 77063 BREAST TOMOSYNTHESIS BI: CPT

## 2023-11-02 ENCOUNTER — OFFICE VISIT (OUTPATIENT)
Dept: GASTROENTEROLOGY | Facility: CLINIC | Age: 58
End: 2023-11-02
Payer: COMMERCIAL

## 2023-11-02 VITALS
HEIGHT: 68 IN | OXYGEN SATURATION: 100 % | DIASTOLIC BLOOD PRESSURE: 51 MMHG | SYSTOLIC BLOOD PRESSURE: 115 MMHG | BODY MASS INDEX: 28.79 KG/M2 | WEIGHT: 190 LBS | HEART RATE: 77 BPM

## 2023-11-02 DIAGNOSIS — K21.00 GASTROESOPHAGEAL REFLUX DISEASE WITH ESOPHAGITIS WITHOUT HEMORRHAGE: ICD-10-CM

## 2023-11-02 DIAGNOSIS — K58.1 IRRITABLE BOWEL SYNDROME WITH CONSTIPATION: ICD-10-CM

## 2023-11-02 DIAGNOSIS — R19.4 ALTERED BOWEL HABITS: ICD-10-CM

## 2023-11-02 DIAGNOSIS — Z91.018 FOOD ALLERGY: ICD-10-CM

## 2023-11-02 DIAGNOSIS — K59.00 CONSTIPATION, UNSPECIFIED CONSTIPATION TYPE: Primary | ICD-10-CM

## 2023-11-02 DIAGNOSIS — R14.0 ABDOMINAL BLOATING: ICD-10-CM

## 2023-11-02 RX ORDER — UREA 10 %
27 LOTION (ML) TOPICAL 2 TIMES DAILY
COMMUNITY

## 2023-11-02 RX ORDER — TENAPANOR HYDROCHLORIDE 53.2 MG/1
50 TABLET ORAL DAILY
Qty: 6 TABLET | Refills: 0 | COMMUNITY
Start: 2023-11-02

## 2023-11-02 NOTE — PROGRESS NOTES
Chief Complaint   1yr follow up    History of Present Illness       Fabiano Piedra is a 58 y.o. female who presents to [facility] for follow-up with a history of IBS constipation, reflux, abdominal bloating, altered bowel habits and food allergies.  Patient has previously trialed Dulcolax, Trulance, Linzess, MiraLAX and probiotics in the past with no relief of her constipation.  Linzess and Trulance both caused diarrhea.  Patient reports MiraLAX caused bloating.  She continues with about a week between her bowel movements.  Since getting bananas out of her diet due to allergies she has had leg cramps that she is adding magnesium and which she plans to start this weekend to see if this improves her bowel movements and leg cramps.  Patient denies fever, nausea, vomiting, weight loss, night sweats, melena, hematochezia, hematemesis.    Endoscopy: Review of the patient's most recent EGD and colonoscopy performed by Dr. Robles on 08.01.2022 diverticulosis, ascending colon polyp tubular adenoma 5-year recall.  EGD small hiatal hernia normal biopsies.     XR abdomen KUB on 05.19.2023  Moderate amount of stool formed in the colon fecal stasis or constipation.  No mechanical bowel obstruction noted.    Most recent labs on 08.30.2023  Results       Result Review :   The following data was reviewed by: TAMI Alfred on 11/02/2023:    CMP          8/30/2023    16:16   CMP   Glucose 100    BUN 21    Creatinine 0.93    EGFR 70.5    Sodium 142    Potassium 4.0    Chloride 101    Calcium 10.0    Total Protein 7.4    Albumin 4.7    Globulin 2.7    Total Bilirubin 0.4    Alkaline Phosphatase 87    AST (SGOT) 20    ALT (SGPT) 17    Albumin/Globulin Ratio 1.7    BUN/Creatinine Ratio 22.6    Anion Gap 10.9      CBC          8/30/2023    16:16   CBC   WBC 7.08    RBC 4.77    Hemoglobin 14.2    Hematocrit 42.4    MCV 88.9    MCH 29.8    MCHC 33.5    RDW 12.5    Platelets 342        Iron  "Profile No results found for: \"IRON\", \"TIBC\", \"LABIRON\", \"TRANSFERRIN\"  Ferritin No results found for: \"FERRITIN\"            Past Medical History       Past Medical History:   Diagnosis Date    Allergic     Anxiety     GERD (gastroesophageal reflux disease)        Past Surgical History:   Procedure Laterality Date    COLONOSCOPY      COLONOSCOPY N/A 2022    Procedure: COLONOSCOPY WITH POLYPECTOMY;  Surgeon: Franc Robles MD;  Location: [facility] ENDOSCOPY;  Service: Gastroenterology;  Laterality: N/A;  COLON POLYP, DIVERTICULOSIS    ENDOMETRIAL ABLATION  May 2009    ENDOSCOPY      ENDOSCOPY N/A 2022    Procedure: ESOPHAGOGASTRODUODENOSCOPY WITH BX;  Surgeon: Franc Robles MD;  Location: [facility] ENDOSCOPY;  Service: Gastroenterology;  Laterality: N/A;  HIATAL HERNIA    LASER ABLATION      SINUS SURGERY      UPPER GASTROINTESTINAL ENDOSCOPY           Current Outpatient Medications:     cetirizine-pseudoephedrine (ZyrTEC-D) 5-120 MG per 12 hr tablet, , Disp: , Rfl:     Diclofenac Sodium (VOLTAREN) 1 % gel gel, Apply 4 g topically to the appropriate area as directed 4 (Four) Times a Day As Needed (pain)., Disp: 350 g, Rfl: 0    dicyclomine (BENTYL) 10 MG capsule, TAKE TWO CAPSULES BY MOUTH FOUR TIMES A DAY BEFORE MEALS AND AT BEDTIME FOR 30 DAYS, Disp: 240 capsule, Rfl: 3    magnesium, as, gluconate (MAGONATE) 500 (27 Mg) MG tablet, Take 1 tablet by mouth 2 (Two) Times a Day., Disp: , Rfl:     Tenapanor HCl 50 MG tablet, Take 1 tablet by mouth 2 (Two) Times a Day., Disp: 60 tablet, Rfl: 1     Allergies   Allergen Reactions    Banana Unknown - High Severity    Beef-Derived Products Unknown - High Severity    Dairy Aid [Tilactase] Unknown - Low Severity       Family History   Problem Relation Age of Onset    Heart disease Father          from Heart Disease at 64    Heart disease Brother     Heart disease Mother         Lived to be 96 years old    Colon cancer Neg Hx     Malig " "Hyperthermia Neg Hx         Social History     Social History Narrative    Not on file       Objective     Vital Signs:   /51 (BP Location: Right arm, Patient Position: Sitting, Cuff Size: Adult)   Pulse 77   Ht 172.7 cm (68\")   Wt 86.2 kg (190 lb)   SpO2 100%   BMI 28.89 kg/m²       Physical Exam  Constitutional:       Appearance: Normal appearance. She is normal weight.   Pulmonary:      Effort: Pulmonary effort is normal.   Abdominal:      General: Abdomen is flat.   Neurological:      Mental Status: She is alert and oriented to person, place, and time.   Psychiatric:         Mood and Affect: Mood normal.         Behavior: Behavior normal.           Assessment & Plan          Assessment and Plan    Diagnoses and all orders for this visit:    1. Constipation, unspecified constipation type (Primary)    2. Irritable bowel syndrome with constipation    3. Abdominal bloating    4. Food allergy    5. Altered bowel habits    6. Gastroesophageal reflux disease with esophagitis without hemorrhage    Other orders  -     Tenapanor HCl 50 MG tablet; Take 1 tablet by mouth 2 (Two) Times a Day.  Dispense: 60 tablet; Refill: 1      58-year-old female presenting the office today for follow-up with a history of IBS constipation, reflux, abdominal bloating, altered bowel habits and food allergies.  Patient given a sample of his Rela in office.  Patient is also planning to trial magnesium.  Patient has previously trialed and failed Trulance, Linzess, MiraLAX and Dulcolax with no relief of constipation.  Patient will follow-up in office in 6 months.  Patient agreeable to this plan will call with any questions or concerns.          Follow Up       Follow Up   Return in about 6 months (around 5/2/2024).  Patient was given instructions and counseling regarding her condition or for health maintenance advice. Please see specific information pulled into the AVS if appropriate.       "

## 2023-11-28 ENCOUNTER — OFFICE VISIT (OUTPATIENT)
Dept: INTERNAL MEDICINE | Facility: CLINIC | Age: 58
End: 2023-11-28
Payer: COMMERCIAL

## 2023-11-28 VITALS
DIASTOLIC BLOOD PRESSURE: 78 MMHG | TEMPERATURE: 97.2 F | BODY MASS INDEX: 28.89 KG/M2 | OXYGEN SATURATION: 98 % | HEIGHT: 68 IN | SYSTOLIC BLOOD PRESSURE: 128 MMHG | WEIGHT: 190.6 LBS | HEART RATE: 64 BPM

## 2023-11-28 DIAGNOSIS — R09.81 NASAL CONGESTION: Primary | ICD-10-CM

## 2023-11-28 DIAGNOSIS — Z23 NEED FOR INFLUENZA VACCINATION: ICD-10-CM

## 2023-11-28 DIAGNOSIS — J01.00 ACUTE NON-RECURRENT MAXILLARY SINUSITIS: ICD-10-CM

## 2023-11-28 LAB
EXPIRATION DATE: NORMAL
EXPIRATION DATE: NORMAL
FLUAV AG UPPER RESP QL IA.RAPID: NOT DETECTED
FLUBV AG UPPER RESP QL IA.RAPID: NOT DETECTED
INTERNAL CONTROL: NORMAL
INTERNAL CONTROL: NORMAL
Lab: NORMAL
Lab: NORMAL
S PYO AG THROAT QL: NEGATIVE
SARS-COV-2 AG UPPER RESP QL IA.RAPID: NOT DETECTED

## 2023-11-28 PROCEDURE — 87081 CULTURE SCREEN ONLY: CPT | Performed by: NURSE PRACTITIONER

## 2023-11-28 RX ORDER — METHYLPREDNISOLONE SODIUM SUCCINATE 125 MG/2ML
125 INJECTION, POWDER, LYOPHILIZED, FOR SOLUTION INTRAMUSCULAR; INTRAVENOUS EVERY 6 HOURS
Status: DISCONTINUED | OUTPATIENT
Start: 2023-11-28 | End: 2023-11-28

## 2023-11-28 RX ORDER — METHYLPREDNISOLONE SODIUM SUCCINATE 125 MG/2ML
125 INJECTION, POWDER, LYOPHILIZED, FOR SOLUTION INTRAMUSCULAR; INTRAVENOUS ONCE
Status: COMPLETED | OUTPATIENT
Start: 2023-11-28 | End: 2023-11-28

## 2023-11-28 RX ORDER — AMOXICILLIN AND CLAVULANATE POTASSIUM 875; 125 MG/1; MG/1
1 TABLET, FILM COATED ORAL 2 TIMES DAILY
Qty: 20 TABLET | Refills: 0 | Status: SHIPPED | OUTPATIENT
Start: 2023-11-28 | End: 2023-12-08

## 2023-11-28 RX ADMIN — METHYLPREDNISOLONE SODIUM SUCCINATE 125 MG: 125 INJECTION, POWDER, LYOPHILIZED, FOR SOLUTION INTRAMUSCULAR; INTRAVENOUS at 11:33

## 2023-11-28 NOTE — PROGRESS NOTES
"Chief Complaint  Nasal Congestion, Earache, and Headache    Subjective          Fabiano Piedra presents to [facility]  URI   This is a new problem. The problem has been unchanged. Associated symptoms include congestion, coughing, rhinorrhea and sinus pain. Pertinent negatives include no abdominal pain, chest pain, diarrhea, dysuria, ear pain, headaches, joint pain, joint swelling, nausea, neck pain, plugged ear sensation, rash, sneezing, sore throat, swollen glands, vomiting or wheezing. She has tried nothing for the symptoms. The treatment provided no relief.         Current Outpatient Medications   Medication Instructions    amoxicillin-clavulanate (AUGMENTIN) 875-125 MG per tablet 1 tablet, Oral, 2 Times Daily    cetirizine-pseudoephedrine (ZyrTEC-D) 5-120 MG per 12 hr tablet No dose, route, or frequency recorded.    Diclofenac Sodium (VOLTAREN) 4 g, Topical, 4 Times Daily PRN    dicyclomine (BENTYL) 10 MG capsule TAKE TWO CAPSULES BY MOUTH FOUR TIMES A DAY BEFORE MEALS AND AT BEDTIME FOR 30 DAYS    Ibsrela 50 mg, Oral, Daily    magnesium (as) gluconate (MAGONATE) 27 mg, Oral, 2 Times Daily    Tenapanor HCl 50 mg, Oral, 2 Times Daily       The following portions of the patient's history were reviewed and updated as appropriate: allergies, current medications, past family history, past medical history, past social history, past surgical history, and problem list.    Objective   Vital Signs:   /78 (BP Location: Left arm, Patient Position: Sitting, Cuff Size: Adult)   Pulse 64   Temp 97.2 °F (36.2 °C) (Temporal)   Ht 172.7 cm (68\")   Wt 86.5 kg (190 lb 9.6 oz)   SpO2 98%   BMI 28.98 kg/m²     BP Readings from Last 3 Encounters:   11/28/23 128/78   11/02/23 115/51   08/30/23 117/68     Wt Readings from Last 3 Encounters:   11/28/23 86.5 kg (190 lb 9.6 oz)   11/02/23 86.2 kg (190 lb)   08/30/23 85.7 kg (189 lb)           Physical Exam  HENT:      " Right Ear: Tympanic membrane, ear canal and external ear normal.      Left Ear: Tympanic membrane, ear canal and external ear normal.      Nose: Congestion present.      Right Sinus: Maxillary sinus tenderness present.      Left Sinus: Maxillary sinus tenderness present.          Appearance: No acute distress, well-nourished  Head: normocephalic, atraumatic  Eyes: extraocular movements intact, no scleral icterus, no conjunctival injection  Ears, Nose, and Throat: external ears normal, nares patent, moist mucous membranes  Cardiovascular: regular rate and rhythm. no murmurs, rubs, or gallops. no edema  Respiratory: breathing comfortably, symmetric chest rise, clear to auscultation bilaterally. No wheezes, rales, or rhonchi.  Neuro: alert and oriented to time, place, and person. Normal gait  Psych: normal mood and affect     Result Review :   The following data was reviewed by: TAMI Morelos on 11/28/2023:  Common labs          8/30/2023    16:16   Common Labs   Glucose 100    BUN 21    Creatinine 0.93    Sodium 142    Potassium 4.0    Chloride 101    Calcium 10.0    Albumin 4.7    Total Bilirubin 0.4    Alkaline Phosphatase 87    AST (SGOT) 20    ALT (SGPT) 17    WBC 7.08    Hemoglobin 14.2    Hematocrit 42.4    Platelets 342    Total Cholesterol 225    Triglycerides 41    HDL Cholesterol 94    LDL Cholesterol  124    Hemoglobin A1C 5.60             Lab Results   Component Value Date    SARSANTIGEN Not Detected 11/28/2023    FLUAAG Not Detected 11/28/2023    FLUBAG Not Detected 11/28/2023    RAPSCRN Negative 11/28/2023       Procedures        Assessment and Plan    Diagnoses and all orders for this visit:    1. Nasal congestion (Primary)  -     POCT SARS-CoV-2 Antigen VALERIA + Flu  -     POCT rapid strep A  -     COVID-19,CEPHEID/MINNIE,COR/CONOR/PAD/MONTEZ/LAG IN-HOUSE,NP SWAB IN TRANSPORT MEDIA 1 HR TAT, RT-PCR - Swab, Nasopharynx  -     Beta Strep Culture, Throat - , Throat    2. Need for influenza  vaccination  -     Fluzone >6 Months ([id number])    3. Acute non-recurrent maxillary sinusitis  -     amoxicillin-clavulanate (AUGMENTIN) 875-125 MG per tablet; Take 1 tablet by mouth 2 (Two) Times a Day for 10 days.  Dispense: 20 tablet; Refill: 0  -     Discontinue: methylPREDNISolone sodium succinate (SOLU-Medrol) injection 125 mg  -     methylPREDNISolone sodium succinate (SOLU-Medrol) injection 125 mg          Medications Discontinued During This Encounter   Medication Reason    methylPREDNISolone sodium succinate (SOLU-Medrol) injection 125 mg           Follow Up   Return for Annual physical- 1 year from [facility] care .  Patient was given instructions and counseling regarding her condition or for health maintenance advice. Please see specific information pulled into the AVS if appropriate.       Swapna Sandra, APRN  11/28/23  12:36 EST

## 2023-11-30 LAB — BACTERIA SPEC AEROBE CULT: NORMAL

## 2024-04-09 ENCOUNTER — PATIENT ROUNDING (BHMG ONLY) (OUTPATIENT)
Dept: URGENT CARE | Facility: CLINIC | Age: 59
End: 2024-04-09
Payer: COMMERCIAL

## 2024-04-09 NOTE — ED NOTES
Thank you for letting us care for you in your recent visit to our urgent care center. We would love to hear about your experience with us. Was this the first time you have visited our location?    We’re always looking for ways to make our patients’ experiences even better. Do you have any recommendations on ways we may improve?     I appreciate you taking the time to respond. Please be on the lookout for a survey about your recent visit from [facility] via text or email. We would greatly appreciate if you could fill that out and turn it back in. We want your voice to be heard and we value your feedback.   Thank you for choosing [facility] for your healthcare needs.

## 2024-04-15 ENCOUNTER — LAB REQUISITION (OUTPATIENT)
Dept: LAB | Facility: HOSPITAL | Age: 59
End: 2024-04-15
Payer: COMMERCIAL

## 2024-04-15 DIAGNOSIS — R30.0 DYSURIA: ICD-10-CM

## 2024-04-15 PROCEDURE — 87086 URINE CULTURE/COLONY COUNT: CPT | Performed by: OBSTETRICS & GYNECOLOGY

## 2024-04-17 LAB — BACTERIA SPEC AEROBE CULT: NORMAL

## 2024-04-22 PROCEDURE — 87510 GARDNER VAG DNA DIR PROBE: CPT | Performed by: NURSE PRACTITIONER

## 2024-04-22 PROCEDURE — 87660 TRICHOMONAS VAGIN DIR PROBE: CPT | Performed by: NURSE PRACTITIONER

## 2024-04-22 PROCEDURE — 87480 CANDIDA DNA DIR PROBE: CPT | Performed by: NURSE PRACTITIONER

## 2024-04-22 PROCEDURE — 87086 URINE CULTURE/COLONY COUNT: CPT | Performed by: NURSE PRACTITIONER

## 2024-05-02 ENCOUNTER — OFFICE VISIT (OUTPATIENT)
Dept: GASTROENTEROLOGY | Facility: CLINIC | Age: 59
End: 2024-05-02
Payer: COMMERCIAL

## 2024-05-02 VITALS
WEIGHT: 190.7 LBS | BODY MASS INDEX: 29.93 KG/M2 | HEART RATE: 77 BPM | SYSTOLIC BLOOD PRESSURE: 119 MMHG | DIASTOLIC BLOOD PRESSURE: 61 MMHG | OXYGEN SATURATION: 100 % | HEIGHT: 67 IN

## 2024-05-02 DIAGNOSIS — R10.84 GENERALIZED ABDOMINAL PAIN: ICD-10-CM

## 2024-05-02 DIAGNOSIS — K58.1 IRRITABLE BOWEL SYNDROME WITH CONSTIPATION: Primary | ICD-10-CM

## 2024-05-02 RX ORDER — VALACYCLOVIR HYDROCHLORIDE 500 MG/1
500 TABLET, FILM COATED ORAL 2 TIMES DAILY
COMMUNITY

## 2024-05-02 RX ORDER — LUBIPROSTONE 24 UG/1
24 CAPSULE ORAL 2 TIMES DAILY WITH MEALS
Qty: 28 CAPSULE | Refills: 5 | Status: SHIPPED | OUTPATIENT
Start: 2024-05-02 | End: 2024-05-16

## 2024-05-02 NOTE — PROGRESS NOTES
Chief Complaint  Constipation (Patient stated the constipation is lasting 2 weeks at most and she is resorting to over the counter stool softer and takes 3 laxatives just to get relief she stated sometimes it works sometimes it does not.   ) and Abdominal Cramping (Lower abdomin)    Fabiano Piedra is a 59 y.o. female who presents to [facility]- [place] for constipation    History of present Illness  Patient presents to the office for follow-up with a history of IBS-constipation. Patient has previously trialed Dulcolax, Trulance, Linzess, MiraLAX and probiotics in the past with no relief of her constipation. Linzess and Trulance both caused diarrhea. Patient reports MiraLAX caused bloating. Patient prescribed Ibsrela at last office visit but it did not relieve constipation. Magensium supplement did provide some relief but ultimately stoppped being effective.  She has a bowel movement every 2 weeks and has severe abdominal cramping. She has had relief of reflux after eliminating bananas, dairy, and beef.     EGD/colonoscopy 8/1/2022 Dr. Robles-normal esophagus, small hiatal hernia, normal stomach and duodenum. Stomach biopsies - mild chronic inactive gastritis. Duodenum biopsies. Tubular adenoma polyp in ascending colon and diverticula in sigmoid colon. Repeat 5 years.     Past Medical History:   Diagnosis Date    Allergic     Anxiety     GERD (gastroesophageal reflux disease)     Irritable bowel syndrome        Past Surgical History:   Procedure Laterality Date    COLONOSCOPY  2015    COLONOSCOPY N/A 08/01/2022    Procedure: COLONOSCOPY WITH POLYPECTOMY;  Surgeon: Franc Robles MD;  Location: [facility];  Service: Gastroenterology;  Laterality: N/A;  COLON POLYP, DIVERTICULOSIS    ENDOMETRIAL ABLATION  May 2009    ENDOSCOPY  2015    ENDOSCOPY N/A 08/01/2022    Procedure: ESOPHAGOGASTRODUODENOSCOPY WITH BX;  Surgeon: Franc Robles MD;  Location:   "[facility];  Service: Gastroenterology;  Laterality: N/A;  HIATAL HERNIA    LASER ABLATION      SINUS SURGERY      UPPER GASTROINTESTINAL ENDOSCOPY           Current Outpatient Medications:     cetirizine-pseudoephedrine (ZyrTEC-D) 5-120 MG per 12 hr tablet, , Disp: , Rfl:     dicyclomine (BENTYL) 10 MG capsule, TAKE TWO CAPSULES BY MOUTH FOUR TIMES A DAY BEFORE MEALS AND AT BEDTIME FOR 30 DAYS, Disp: 240 capsule, Rfl: 3    valACYclovir (VALTREX) 500 MG tablet, Take 1 tablet by mouth 2 (Two) Times a Day., Disp: , Rfl:     azelastine (ASTELIN) 0.1 % nasal spray, 2 sprays into the nostril(s) as directed by provider 2 (Two) Times a Day. Use in each nostril as directed, Disp: 30 mL, Rfl: 2    Diclofenac Sodium (VOLTAREN) 1 % gel gel, Apply 4 g topically to the appropriate area as directed 4 (Four) Times a Day As Needed (pain)., Disp: 350 g, Rfl: 0    lubiprostone (Amitiza) 24 MCG capsule, Take 1 capsule by mouth 2 (Two) Times a Day With Meals for 14 days., Disp: 28 capsule, Rfl: 5    magnesium, as, gluconate (MAGONATE) 500 (27 Mg) MG tablet, Take 1 tablet by mouth 2 (Two) Times a Day., Disp: , Rfl:     phenazopyridine (PYRIDIUM) 200 MG tablet, Take 1 tablet by mouth 3 (Three) Times a Day As Needed for Bladder Spasms or Dysuria., Disp: 6 tablet, Rfl: 0     Allergies   Allergen Reactions    Macrobid [Nitrofurantoin] Hives    Banana Unknown - High Severity    Beef-Derived Products Unknown - High Severity    Dairy Aid [Tilactase] Unknown - Low Severity       Family History   Problem Relation Age of Onset    Heart disease Father          from Heart Disease at 64    Heart disease Brother     Heart disease Mother         Lived to be 96 years old    Colon cancer Neg Hx     Malig Hyperthermia Neg Hx         Social History     Social History Narrative    Not on file       Objective       Vital Signs:   /61 (BP Location: Right arm, Patient Position: Sitting, Cuff Size: Adult)   Pulse 77   Ht 170.2 cm (67\")   Wt " 86.5 kg (190 lb 11.2 oz)   SpO2 100%   BMI 29.87 kg/m²       Physical Exam  Constitutional:       Appearance: Normal appearance. She is normal weight.   HENT:      Head: Normocephalic and atraumatic.      Nose: Nose normal.   Pulmonary:      Effort: Pulmonary effort is normal.   Skin:     General: Skin is warm and dry.   Neurological:      Mental Status: She is alert and oriented to person, place, and time. Mental status is at baseline.   Psychiatric:         Mood and Affect: Mood normal.         Behavior: Behavior normal.         Thought Content: Thought content normal.         Judgment: Judgment normal.         Result Review :       CBC w/diff          8/30/2023    16:16   CBC w/Diff   WBC 7.08    RBC 4.77    Hemoglobin 14.2    Hematocrit 42.4    MCV 88.9    MCH 29.8    MCHC 33.5    RDW 12.5    Platelets 342    Neutrophil Rel % 70.0    Immature Granulocyte Rel % 0.3    Lymphocyte Rel % 22.2    Monocyte Rel % 6.2    Eosinophil Rel % 0.7    Basophil Rel % 0.6      CMP          8/30/2023    16:16   CMP   Glucose 100    BUN 21    Creatinine 0.93    EGFR 70.5    Sodium 142    Potassium 4.0    Chloride 101    Calcium 10.0    Total Protein 7.4    Albumin 4.7    Globulin 2.7    Total Bilirubin 0.4    Alkaline Phosphatase 87    AST (SGOT) 20    ALT (SGPT) 17    Albumin/Globulin Ratio 1.7    BUN/Creatinine Ratio 22.6    Anion Gap 10.9                    Assessment and Plan    Diagnoses and all orders for this visit:    1. Irritable bowel syndrome with constipation (Primary)    2. Generalized abdominal pain    Other orders  -     lubiprostone (Amitiza) 24 MCG capsule; Take 1 capsule by mouth 2 (Two) Times a Day With Meals for 14 days.  Dispense: 28 capsule; Refill: 5    59 year old patient presents to the office for follow-up with a history of IBS-constipation. Patient has previously trialed Dulcolax, Trulance, Linzess, MiraLAX and probiotics in the past with no relief of her constipation. Linzess and Trulance both caused  diarrhea. Patient reports MiraLAX caused bloating. Patient prescribed Ibsrela at last office visit but it did not relieve constipation. Magensium supplement did provide some relief but ultimately stoppped being effective.  She has a bowel movement every 2 weeks and has severe abdominal cramping. She has had relief of reflux after eliminating bananas, dairy, and beef. Plan to proceed with trial of Amitiza 24, patient will start with once a day dosing and increasing to BID as needed. Patient will maintain follow up appointment as scheduled. Patient is agreeable to plan and will call the office with any questions or concerns.     Follow Up   Return for Next scheduled follow up.  Patient was given instructions and counseling regarding her condition or for health maintenance advice. Please see specific information pulled into the AVS if appropriate.

## 2024-07-18 ENCOUNTER — OFFICE VISIT (OUTPATIENT)
Dept: INTERNAL MEDICINE | Facility: CLINIC | Age: 59
End: 2024-07-18
Payer: COMMERCIAL

## 2024-07-18 ENCOUNTER — HOSPITAL ENCOUNTER (OUTPATIENT)
Dept: GENERAL RADIOLOGY | Facility: HOSPITAL | Age: 59
Discharge: HOME | End: 2024-07-18
Admitting: NURSE PRACTITIONER
Payer: COMMERCIAL

## 2024-07-18 VITALS
WEIGHT: 190 LBS | HEART RATE: 68 BPM | SYSTOLIC BLOOD PRESSURE: 114 MMHG | OXYGEN SATURATION: 96 % | DIASTOLIC BLOOD PRESSURE: 71 MMHG | TEMPERATURE: 97.7 F | BODY MASS INDEX: 29.82 KG/M2 | HEIGHT: 67 IN

## 2024-07-18 DIAGNOSIS — M54.32 BILATERAL SCIATICA: ICD-10-CM

## 2024-07-18 DIAGNOSIS — Z91.011 DAIRY ALLERGY: ICD-10-CM

## 2024-07-18 DIAGNOSIS — Z91.018 ALLERGY TO BANANA: ICD-10-CM

## 2024-07-18 DIAGNOSIS — M25.552 BILATERAL HIP PAIN: ICD-10-CM

## 2024-07-18 DIAGNOSIS — M25.552 BILATERAL HIP PAIN: Primary | ICD-10-CM

## 2024-07-18 DIAGNOSIS — Z91.014 ALLERGY TO BEEF: ICD-10-CM

## 2024-07-18 DIAGNOSIS — M25.551 BILATERAL HIP PAIN: Primary | ICD-10-CM

## 2024-07-18 DIAGNOSIS — R45.86 MOOD SWINGS: ICD-10-CM

## 2024-07-18 DIAGNOSIS — M54.31 BILATERAL SCIATICA: ICD-10-CM

## 2024-07-18 DIAGNOSIS — K59.00 CONSTIPATION, UNSPECIFIED CONSTIPATION TYPE: ICD-10-CM

## 2024-07-18 DIAGNOSIS — M25.551 BILATERAL HIP PAIN: ICD-10-CM

## 2024-07-18 PROCEDURE — 99214 OFFICE O/P EST MOD 30 MIN: CPT | Performed by: NURSE PRACTITIONER

## 2024-07-18 PROCEDURE — 73521 X-RAY EXAM HIPS BI 2 VIEWS: CPT

## 2024-07-18 RX ORDER — DULOXETIN HYDROCHLORIDE 20 MG/1
20 CAPSULE, DELAYED RELEASE ORAL DAILY
Qty: 30 CAPSULE | Refills: 1 | Status: SHIPPED | OUTPATIENT
Start: 2024-07-18

## 2024-07-18 NOTE — PROGRESS NOTES
Chief Complaint  GI Problem (Feels that she is not getting anywhere with her other doctors about this), Hip Pain, Hot Flashes, and Mood Swings ( has noticed this, having severe mood swings )    Subjective          Fabiano Piedra presents to [facility]  History of Present Illness    Bilateral hip pain   Worse at night   Has tried diclofenac gel   Side slipper and makes the pain worse   Sciatica     Hot flashes again.   Huge mood swings.   LMP: ablation   Menopause symptoms started years ago       GI Note:   Patient presents to the office for follow-up with a history of IBS-constipation. Patient has previously trialed Dulcolax, Trulance, Linzess, MiraLAX and probiotics in the past with no relief of her constipation. Linzess and Trulance both caused diarrhea. Patient reports MiraLAX caused bloating. Patient prescribed Ibsrela at last office visit but it did not relieve constipation. Magensium supplement did provide some relief but ultimately stoppped being effective.  She has a bowel movement every 2 weeks and has severe abdominal cramping. She has had relief of reflux after eliminating bananas, dairy, and beef.   Magnesium     Current Outpatient Medications   Medication Instructions    cetirizine-pseudoephedrine (ZyrTEC-D) 5-120 MG per 12 hr tablet No dose, route, or frequency recorded.    dicyclomine (BENTYL) 10 MG capsule TAKE TWO CAPSULES BY MOUTH FOUR TIMES A DAY BEFORE MEALS AND AT BEDTIME FOR 30 DAYS    DULoxetine (CYMBALTA) 20 mg, Oral, Daily    linaclotide (LINZESS) 72 mcg, Oral, Every Morning Before Breakfast    valACYclovir (VALTREX) 500 mg, Oral, 2 Times Daily       The following portions of the patient's history were reviewed and updated as appropriate: allergies, current medications, past family history, past medical history, past social history, past surgical history, and problem list.    Objective   Vital Signs:   /71 (BP Location:  "Right arm, Patient Position: Sitting, Cuff Size: Adult)   Pulse 68   Temp 97.7 °F (36.5 °C) (Temporal)   Ht 170.2 cm (67\")   Wt 86.2 kg (190 lb)   SpO2 96%   BMI 29.76 kg/m²     BP Readings from Last 3 Encounters:   07/18/24 114/71   05/02/24 119/61   04/22/24 98/82     Wt Readings from Last 3 Encounters:   07/18/24 86.2 kg (190 lb)   05/02/24 86.5 kg (190 lb 11.2 oz)   04/22/24 84.4 kg (186 lb)           Physical Exam     Appearance: No acute distress, well-nourished  Head: normocephalic, atraumatic  Eyes: extraocular movements intact, no scleral icterus, no conjunctival injection  Ears, Nose, and Throat: external ears normal, nares patent, moist mucous membranes  Cardiovascular: regular rate and rhythm. no murmurs, rubs, or gallops. no edema  Respiratory: breathing comfortably, symmetric chest rise, clear to auscultation bilaterally. No wheezes, rales, or rhonchi.  Neuro: alert and oriented to time, place, and person. Normal gait  Psych: normal mood and affect     Result Review :   The following data was reviewed by: TAMI Morelos on 07/18/2024:  Common labs          8/30/2023    16:16   Common Labs   Glucose 100    BUN 21    Creatinine 0.93    Sodium 142    Potassium 4.0    Chloride 101    Calcium 10.0    Albumin 4.7    Total Bilirubin 0.4    Alkaline Phosphatase 87    AST (SGOT) 20    ALT (SGPT) 17    WBC 7.08    Hemoglobin 14.2    Hematocrit 42.4    Platelets 342    Total Cholesterol 225    Triglycerides 41    HDL Cholesterol 94    LDL Cholesterol  124    Hemoglobin A1C 5.60             Lab Results   Component Value Date    SARSANTIGEN Not Detected 11/28/2023    FLUAAG Not Detected 11/28/2023    FLUBAG Not Detected 11/28/2023    RAPSCRN Negative 11/28/2023    BILIRUBINUR Small (1+) (A) 04/22/2024            Assessment and Plan    Diagnoses and all orders for this visit:    1. Bilateral hip pain (Primary)  -     Ambulatory Referral to Orthopedic Surgery  -     XR Hips Bilateral With or Without " Pelvis 2 View; Future    2. Bilateral sciatica    3. Mood swings  Comments:  New.  Will trial Cymbalta  Orders:  -     DULoxetine (Cymbalta) 20 MG capsule; Take 1 capsule by mouth Daily.  Dispense: 30 capsule; Refill: 1    4. Dairy allergy    5. Allergy to banana    6. 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 Constipation, unspecified constipation type  Overview:  Added automatically from request for surgery [id number]    Orders:  -     Ambulatory Referral to Gastroenterology  -     linaclotide (LINZESS) 72 MCG capsule capsule; Take 1 capsule by mouth Every Morning Before Breakfast.  Dispense: 30 capsule; Refill: 1          Medications Discontinued During This Encounter   Medication Reason    phenazopyridine (PYRIDIUM) 200 MG tablet     magnesium, as, gluconate (MAGONATE) 500 (27 Mg) MG tablet     Diclofenac Sodium (VOLTAREN) 1 % gel gel     azelastine (ASTELIN) 0.1 % nasal spray           Follow Up   Return in about 4 weeks (around 8/15/2024) for Anxiety, Depression.  Patient was given instructions and counseling regarding her condition or for health maintenance advice. Please see specific information pulled into the AVS if appropriate.       Swapna Sandra, TAMI  07/24/24  13:44 EDT              Answers submitted by the patient for this visit:  Other (Submitted on 7/16/2024)  Please describe your symptoms.: Hip and leg pain when I sleep at night. , Gastrointestinal issues that include severe cramping and constipation. , Hot flashes and major mood swings  Have you had these symptoms before?: Yes  How long have you been having these symptoms?: Greater than 2 weeks  Please list any medications you are currently taking for this condition.: Dicycolomine 10mg as needed, Zytrec D daily pill, Valtrex 500 mg. Daily  Please describe any probable cause for these symptoms. : Menopause, possible Bursitis in hip, constipation and gastrointestinal issues.  Primary Reason for Visit (Submitted on 7/16/2024)  What is the primary reason for your  visit?: Other

## 2024-08-12 ENCOUNTER — TELEPHONE (OUTPATIENT)
Dept: INTERNAL MEDICINE | Facility: CLINIC | Age: 59
End: 2024-08-12

## 2024-08-12 NOTE — TELEPHONE ENCOUNTER
Hub staff attempted to follow warm transfer process and was unsuccessful     Caller: Fabiano Piedra    Relationship to patient: Self    Best call back number: [phone]    Patient is needing: PATIENT IS NEEDING TO RESCHEDULE HER 08/22/2024 APPOINTMENT FOR THE WEEK AFTER, BUT THERE WAS NO OPENINGS UNTIL 09/18/2024.

## 2024-08-21 ENCOUNTER — OFFICE VISIT (OUTPATIENT)
Dept: ORTHOPEDIC SURGERY | Facility: CLINIC | Age: 59
End: 2024-08-21
Payer: COMMERCIAL

## 2024-08-21 VITALS
OXYGEN SATURATION: 97 % | WEIGHT: 186 LBS | HEART RATE: 67 BPM | HEIGHT: 67 IN | BODY MASS INDEX: 29.19 KG/M2 | SYSTOLIC BLOOD PRESSURE: 117 MMHG | DIASTOLIC BLOOD PRESSURE: 71 MMHG

## 2024-08-21 DIAGNOSIS — M70.62 TROCHANTERIC BURSITIS OF LEFT HIP: ICD-10-CM

## 2024-08-21 DIAGNOSIS — M70.61 TROCHANTERIC BURSITIS OF RIGHT HIP: Primary | ICD-10-CM

## 2024-08-21 RX ORDER — LIDOCAINE HYDROCHLORIDE 10 MG/ML
5 INJECTION, SOLUTION INFILTRATION; PERINEURAL
Status: COMPLETED | OUTPATIENT
Start: 2024-08-21 | End: 2024-08-21

## 2024-08-21 RX ORDER — TRIAMCINOLONE ACETONIDE 40 MG/ML
40 INJECTION, SUSPENSION INTRA-ARTICULAR; INTRAMUSCULAR
Status: COMPLETED | OUTPATIENT
Start: 2024-08-21 | End: 2024-08-21

## 2024-08-21 RX ADMIN — TRIAMCINOLONE ACETONIDE 40 MG: 40 INJECTION, SUSPENSION INTRA-ARTICULAR; INTRAMUSCULAR at 11:34

## 2024-08-21 RX ADMIN — LIDOCAINE HYDROCHLORIDE 5 ML: 10 INJECTION, SOLUTION INFILTRATION; PERINEURAL at 11:34

## 2024-08-21 NOTE — PROGRESS NOTES
"Chief Complaint  Initial Evaluation and Pain of the Left Hip and Initial Evaluation and Pain of the Right Hip    Subjective          Fabiano Piedra presents to [facility] for an evaluation of bilateral hip.     History of Present Illness    The patient presents here today for an evaluation  of bilateral hip pain. She has had pain to her hips for several years but states over the last few months her pain has gotten worse. Her right leg hurts worse than her left hip. She locates her pain to her lateral hip. She reports no prior injury, trauma, falls or prior surgery. Denies groin pain.  She has pain with lateral movements.     Allergies   Allergen Reactions    Macrobid [Nitrofurantoin] Hives    Banana Unknown - High Severity    Beef-Derived Products Unknown - High Severity    Dairy Aid [Tilactase] Unknown - Low Severity        Social History     Socioeconomic History    Marital status:    Tobacco Use    Smoking status: Never     Passive exposure: Past    Smokeless tobacco: Never   Vaping Use    Vaping status: Never Used   Substance and Sexual Activity    Alcohol use: Yes     Alcohol/week: 2.0 standard drinks of alcohol     Types: 1 Glasses of wine, 1 Cans of beer per week     Comment: OCCASIONALLY    Drug use: Never    Sexual activity: Yes     Partners: Male     Birth control/protection: Post-menopausal, Vasectomy        I reviewed the patient's chief complaint, history of present illness, review of systems, past medical history, surgical history, family history, social history, medications, and allergy list.     REVIEW OF SYSTEMS    Constitutional: Denies fevers, chills, weight loss  Cardiovascular: Denies chest pain, shortness of breath  Skin: Denies rashes, acute skin changes  Neurologic: Denies headache, loss of consciousness  MSK: bilateral hip pain       Objective   Vital Signs:   /71   Pulse 67   Ht 170.2 cm (67\")   Wt 84.4 kg (186 lb)   SpO2 97%   BMI " 29.13 kg/m²     Body mass index is 29.13 kg/m².    Physical Exam    General: Alert. No acute distress.   Bilateral lower extremity: tender to the lateral hip, flexion to 90 degrees, external rotation 45 degrees, internal rotation to 20 degrees, 5/5 hip flexion and abduction, calf soft, neurovascularly intact, positive EHL, FHL, GS, and TA. Sensation intact to all 5 nerves of the foot.      Large Joint Arthrocentesis: R greater trochanteric bursa  Date/Time: 8/21/2024 11:34 AM  Consent given by: patient  Site marked: site marked  Timeout: Immediately prior to procedure a time out was called to verify the correct patient, procedure, equipment, support staff and site/side marked as required   Supporting Documentation  Indications: pain   Procedure Details  Location: hip - R greater trochanteric bursa  Needle gauge: 21 G.  Medications administered: 5 mL lidocaine 1 %; 40 mg triamcinolone acetonide 40 MG/ML  Patient tolerance: patient tolerated the procedure well with no immediate complications      Large Joint Arthrocentesis: L greater trochanteric bursa  Date/Time: 8/21/2024 11:34 AM  Consent given by: patient  Site marked: site marked  Timeout: Immediately prior to procedure a time out was called to verify the correct patient, procedure, equipment, support staff and site/side marked as required   Supporting Documentation  Indications: pain   Procedure Details  Location: hip - L greater trochanteric bursa  Needle gauge: 21 G.  Medications administered: 5 mL lidocaine 1 %; 40 mg triamcinolone acetonide 40 MG/ML  Patient tolerance: patient tolerated the procedure well with no immediate complications    This injection documentation was Scribed for Mo Barrett MD by Hawa Thomas.  08/21/24   11:35 EDT    Imaging Results (Most Recent)       None                     Assessment and Plan        No results found.     Diagnoses and all orders for this visit:    1. Trochanteric bursitis of right hip (Primary)    2.  Trochanteric bursitis of left hip        The patient presents here today for an evaluation of bilateral hip pain.     Discussed the risks and benefits of bilateral hip steroid injections today in the office. Patient expressed understanding and wishes to proceed. She tolerated the injections well and without any complications.     Home exercises given today and will continue over the counter medications.     Call or return if worsening symptoms.    Scribed for Mo Barrett MD by Suma Rai  08/21/2024   11:16 EDT         Follow Up       PRN     Patient was given instructions and counseling regarding her condition or for health maintenance advice. Please see specific information pulled into the AVS if appropriate.       I have personally performed the services described in this document as scribed by the above individual and it is both accurate and complete. Mo Barrett MD 08/21/24 12:19 EDT

## 2024-08-29 ENCOUNTER — OFFICE VISIT (OUTPATIENT)
Dept: INTERNAL MEDICINE | Facility: CLINIC | Age: 59
End: 2024-08-29
Payer: COMMERCIAL

## 2024-08-29 VITALS
TEMPERATURE: 97.7 F | OXYGEN SATURATION: 98 % | HEART RATE: 76 BPM | BODY MASS INDEX: 29.19 KG/M2 | HEIGHT: 67 IN | DIASTOLIC BLOOD PRESSURE: 58 MMHG | SYSTOLIC BLOOD PRESSURE: 96 MMHG | WEIGHT: 186 LBS

## 2024-08-29 DIAGNOSIS — K58.1 IRRITABLE BOWEL SYNDROME WITH CONSTIPATION: Primary | ICD-10-CM

## 2024-08-29 DIAGNOSIS — M25.552 BILATERAL HIP PAIN: ICD-10-CM

## 2024-08-29 DIAGNOSIS — M25.551 BILATERAL HIP PAIN: ICD-10-CM

## 2024-08-29 DIAGNOSIS — R45.86 MOOD SWINGS: ICD-10-CM

## 2024-08-29 DIAGNOSIS — F41.1 GAD (GENERALIZED ANXIETY DISORDER): ICD-10-CM

## 2024-08-29 PROCEDURE — 99214 OFFICE O/P EST MOD 30 MIN: CPT | Performed by: NURSE PRACTITIONER

## 2024-08-29 RX ORDER — CITALOPRAM HYDROBROMIDE 10 MG/1
10 TABLET ORAL DAILY
Qty: 30 TABLET | Refills: 1 | Status: SHIPPED | OUTPATIENT
Start: 2024-08-29

## 2024-08-29 NOTE — PROGRESS NOTES
"Chief Complaint  Anxiety and Depression    Subjective          Fabiano Piedra presents to [facility]  History of Present Illness  Took cymbalta for 3 days and had headache for 3 days.   Stopped for 5 days went back on for 3 and still had headaches.,   Was taking it in the AM   Having hot flashes and mood swings.     Hip pain: saw Dr. Barrett and had bilateral hip injections which was helpful. Right still has some pain but has improved. Will follow up with him if pain persists.       Current Outpatient Medications   Medication Instructions    cetirizine-pseudoephedrine (ZyrTEC-D) 5-120 MG per 12 hr tablet No dose, route, or frequency recorded.    citalopram (CELEXA) 10 mg, Oral, Daily    dicyclomine (BENTYL) 10 MG capsule TAKE TWO CAPSULES BY MOUTH FOUR TIMES A DAY BEFORE MEALS AND AT BEDTIME FOR 30 DAYS    Tenapanor HCl (CKD) 30 mg, Oral, 2 Times Daily    valACYclovir (VALTREX) 500 mg, Oral, 2 Times Daily       The following portions of the patient's history were reviewed and updated as appropriate: allergies, current medications, past family history, past medical history, past social history, past surgical history, and problem list.    Objective   Vital Signs:   BP 96/58 (BP Location: Left arm, Patient Position: Sitting, Cuff Size: Adult)   Pulse 76   Temp 97.7 °F (36.5 °C) (Temporal)   Ht 170.2 cm (67\")   Wt 84.4 kg (186 lb)   SpO2 98%   BMI 29.13 kg/m²     BP Readings from Last 3 Encounters:   08/29/24 96/58   08/21/24 117/71   07/18/24 114/71     Wt Readings from Last 3 Encounters:   08/29/24 84.4 kg (186 lb)   08/21/24 84.4 kg (186 lb)   07/18/24 86.2 kg (190 lb)     BMI is >= 25 and <30. (Overweight) The following options were offered after discussion;: weight loss educational material (shared in after visit summary), exercise counseling/recommendations, and nutrition counseling/recommendations     Physical Exam     Appearance: No acute distress, " well-nourished  Head: normocephalic, atraumatic  Eyes: extraocular movements intact, no scleral icterus, no conjunctival injection  Ears, Nose, and Throat: external ears normal, nares patent, moist mucous membranes  Cardiovascular: regular rate and rhythm. no murmurs, rubs, or gallops. no edema  Respiratory: breathing comfortably, symmetric chest rise, clear to auscultation bilaterally. No wheezes, rales, or rhonchi.  Neuro: alert and oriented to time, place, and person. Normal gait  Psych: normal mood and affect     Result Review :   The following data was reviewed by: TAMI Morelos on 08/29/2024:           Lab Results   Component Value Date    SARSANTIGEN Not Detected 11/28/2023    FLUAAG Not Detected 11/28/2023    FLUBAG Not Detected 11/28/2023    RAPSCRN Negative 11/28/2023    BILIRUBINUR Small (1+) (A) 04/22/2024            Assessment and Plan    Diagnoses and all orders for this visit:    1. Irritable bowel syndrome with constipation (Primary)  Overview:  Added automatically from request for surgery [id number]    Orders:  -     Tenapanor HCl, CKD, 30 MG tablet; Take 30 mg by mouth 2 (Two) Times a Day.  Dispense: 60 tablet; Refill: 0    2. Mood swings  -     citalopram (CeleXA) 10 MG tablet; Take 1 tablet by mouth Daily.  Dispense: 30 tablet; Refill: 1  -     [facility] - Swab,; Future    3. JAY (generalized anxiety disorder)  -     [facility] - Swab,; Future    4. Bilateral hip pain  Comments:  f/u with Ortho if continues      Spoke with TAMI Multani she states Dr. Robles sees patient's on Tuesday afternoons in office and patient can call and schedule if she desires.     Will start celexa and change depending on [facility] results if she is unhappy with efficacy         Medications Discontinued During This Encounter   Medication Reason    linaclotide (LINZESS) 72 MCG capsule capsule     DULoxetine (Cymbalta) 20 MG capsule           Follow Up   Return in about 4 weeks (around 9/26/2024) for Anxiety,  Depression.  Patient was given instructions and counseling regarding her condition or for health maintenance advice. Please see specific information pulled into the AVS if appropriate.       TAMI Morelos  08/30/24  13:43 EDT

## 2024-08-31 ENCOUNTER — PRIOR AUTHORIZATION (OUTPATIENT)
Dept: INTERNAL MEDICINE | Facility: CLINIC | Age: 59
End: 2024-08-31
Payer: COMMERCIAL

## 2024-09-09 ENCOUNTER — PRIOR AUTHORIZATION (OUTPATIENT)
Dept: INTERNAL MEDICINE | Facility: CLINIC | Age: 59
End: 2024-09-09
Payer: COMMERCIAL

## 2024-09-09 NOTE — TELEPHONE ENCOUNTER
PA REQUIRED FOR FOLLOWING MEDICATION:    citalopram (CeleXA) 10 MG tablet (08/29/2024)     INDEXED VIA ONBASE

## 2024-09-10 ENCOUNTER — TELEPHONE (OUTPATIENT)
Dept: INTERNAL MEDICINE | Facility: CLINIC | Age: 59
End: 2024-09-10
Payer: COMMERCIAL

## 2024-09-10 NOTE — TELEPHONE ENCOUNTER
"Attempted to contact patient. Left message to call the office back.    Relay   HUB OK TO READ/ADVISE:  \"Patient's [facility] report is back and Swapna reccomends trying Pristiq. Is patient agreeable?\"        "

## 2024-09-10 NOTE — TELEPHONE ENCOUNTER
Name: Fabiano Piedra    Relationship: Self    Best Callback Number:     [phone]        HUB PROVIDED THE RELAY MESSAGE FROM THE OFFICE   PATIENT VOICED UNDERSTANDING AND HAS NO FURTHER QUESTIONS AT THIS TIME    ADDITIONAL INFORMATION:PATIENT STATES THAT SHE DOES NOT WANT TO TRY ANY MEDICATION UNTIL SHE CAN DISCUSS IT WITH HANK AT HER APPOINTMENT ON 10.1.24

## 2024-09-16 ENCOUNTER — TRANSCRIBE ORDERS (OUTPATIENT)
Dept: ADMINISTRATIVE | Facility: HOSPITAL | Age: 59
End: 2024-09-16
Payer: COMMERCIAL

## 2024-09-16 DIAGNOSIS — Z12.31 SCREENING MAMMOGRAM, ENCOUNTER FOR: Primary | ICD-10-CM

## 2024-10-28 NOTE — PROGRESS NOTES
"Chief Complaint  Anxiety (4 week follow-up) and Depression (4 week follow-up/)    Subjective          Fabiano Piedra presents to [facility]  History of Present Illness    History of Present Illness    Took cymbalta for 3 days and had headache for 3 days.   Stopped for 5 days went back on for 3 and still had headaches.,   Was taking it in the AM   Having hot flashes and mood swings.     Has been using ground flaxseed   Some mild bloating   Not having the severe cramping       Current Outpatient Medications   Medication Instructions    cetirizine-pseudoephedrine (ZyrTEC-D) 5-120 MG per 12 hr tablet No dose, route, or frequency recorded.    desvenlafaxine (PRISTIQ) 50 mg, Oral, Daily    dicyclomine (BENTYL) 10 MG capsule TAKE TWO CAPSULES BY MOUTH FOUR TIMES A DAY BEFORE MEALS AND AT BEDTIME FOR 30 DAYS    valACYclovir (VALTREX) 500 mg, 2 Times Daily       The following portions of the patient's history were reviewed and updated as appropriate: allergies, current medications, past family history, past medical history, past social history, past surgical history, and problem list.    Objective   Vital Signs:   Ht 170.2 cm (67\")   Wt 85.3 kg (188 lb)   BMI 29.44 kg/m²     BP Readings from Last 3 Encounters:   10/17/24 115/82   08/29/24 96/58   08/21/24 117/71     Wt Readings from Last 3 Encounters:   10/29/24 85.3 kg (188 lb)   10/17/24 84.4 kg (186 lb 1.1 oz)   08/29/24 84.4 kg (186 lb)           Physical Exam     Appearance: No acute distress, well-nourished  Head: normocephalic, atraumatic  Eyes: extraocular movements intact, no scleral icterus, no conjunctival injection  Ears, Nose, and Throat: external ears normal, nares patent, moist mucous membranes  Cardiovascular: regular rate and rhythm. no murmurs, rubs, or gallops. no edema  Respiratory: breathing comfortably, symmetric chest rise, clear to auscultation bilaterally. No wheezes, rales, or " rhonchi.  Neuro: alert and oriented to time, place, and person. Normal gait  Psych: normal mood and affect     Physical Exam        Result Review :   The following data was reviewed by: TAMI Morelos on 10/29/2024:      Results           Lab Results   Component Value Date    SARSANTIGEN Not Detected 10/17/2024    RAPFLUA Negative 10/17/2024    RAPFLUB Negative 10/17/2024    FLUAAG Not Detected 11/28/2023    FLUBAG Not Detected 11/28/2023    RAPSCRN Negative 11/28/2023    BILIRUBINUR Small (1+) (A) 04/22/2024            Assessment and Plan    Diagnoses and all orders for this visit:    1. JAY (generalized anxiety disorder) (Primary)  -     desvenlafaxine (Pristiq) 50 MG 24 hr tablet; Take 1 tablet by mouth Daily.  Dispense: 30 tablet; Refill: 1    2. Mood swings  -     desvenlafaxine (Pristiq) 50 MG 24 hr tablet; Take 1 tablet by mouth Daily.  Dispense: 30 tablet; Refill: 1    3. Need for influenza vaccination  -     Fluzone >6mos      Initiate pristiq today   Assessment & Plan      Medications Discontinued During This Encounter   Medication Reason    methylPREDNISolone (MEDROL) 4 MG dose pack *Therapy completed    Tenapanor HCl, CKD, 30 MG tablet     citalopram (CeleXA) 10 MG tablet Historical Med - Therapy completed          Follow Up   Return in about 4 weeks (around 11/26/2024) for Anxiety, Depression.  Patient was given instructions and counseling regarding her condition or for health maintenance advice. Please see specific information pulled into the AVS if appropriate.       TAMI Morelos  10/29/24  13:27 EDT      Patient or patient representative verbalized consent for the use of Ambient Listening during the visit with  TAMI Morelos for chart documentation. 10/29/2024  13:27 EDT

## 2024-10-29 ENCOUNTER — OFFICE VISIT (OUTPATIENT)
Dept: INTERNAL MEDICINE | Facility: CLINIC | Age: 59
End: 2024-10-29
Payer: COMMERCIAL

## 2024-10-29 VITALS — HEIGHT: 67 IN | BODY MASS INDEX: 29.51 KG/M2 | WEIGHT: 188 LBS

## 2024-10-29 DIAGNOSIS — R45.86 MOOD SWINGS: ICD-10-CM

## 2024-10-29 DIAGNOSIS — F41.1 GAD (GENERALIZED ANXIETY DISORDER): Primary | ICD-10-CM

## 2024-10-29 DIAGNOSIS — Z23 NEED FOR INFLUENZA VACCINATION: ICD-10-CM

## 2024-10-29 RX ORDER — DESVENLAFAXINE 50 MG/1
50 TABLET, FILM COATED, EXTENDED RELEASE ORAL DAILY
Qty: 30 TABLET | Refills: 1 | Status: SHIPPED | OUTPATIENT
Start: 2024-10-29

## 2024-11-07 RX ORDER — CETIRIZINE HYDROCHLORIDE, PSEUDOEPHEDRINE HYDROCHLORIDE 5; 120 MG/1; MG/1
1 TABLET, FILM COATED, EXTENDED RELEASE ORAL 2 TIMES DAILY
Qty: 30 TABLET | Refills: 0 | Status: SHIPPED | OUTPATIENT
Start: 2024-11-07

## 2024-11-07 NOTE — TELEPHONE ENCOUNTER
Caller: DorotheaFabiano    Relationship: Self    Best call back number: [phone]     Requested Prescriptions:   Requested Prescriptions     Pending Prescriptions Disp Refills    cetirizine-pseudoephedrine (ZyrTEC-D) 5-120 MG per 12 hr tablet          Pharmacy where request should be sent: [facility] [id number] [facility] [place] - [address] AT [facility] ( [address]) & [facility] [phone] [facility] [phone] FX     Last office visit with prescribing clinician: 10/29/2024   Last telemedicine visit with prescribing clinician: Visit date not found   Next office visit with prescribing clinician: 12/11/2024     ADDITIONAL NOTES: PATIENT STATES THAT THIS IS NORMALLY PRESCRIBED BY HER ALLERGIST AND SHE IS BETWEEN PROVIDERS AND NEEDS THIS SENT IN UNTIL SHE CAN GET IN FOR HER NEXT APPOINTMENT    Does the patient have less than a 3 day supply:  [x] Yes  [] No    Would you like a call back once the refill request has been completed: [x] Yes [] No    If the office needs to give you a call back, can they leave a voicemail: [x] Yes [] No    Anselmo Bose Rep   11/07/24 10:12 EST

## 2024-11-08 ENCOUNTER — HOSPITAL ENCOUNTER (OUTPATIENT)
Dept: MAMMOGRAPHY | Facility: HOSPITAL | Age: 59
Discharge: HOME | End: 2024-11-08
Admitting: OBSTETRICS & GYNECOLOGY
Payer: COMMERCIAL

## 2024-11-08 DIAGNOSIS — Z12.31 SCREENING MAMMOGRAM, ENCOUNTER FOR: ICD-10-CM

## 2024-11-08 PROCEDURE — 77067 SCR MAMMO BI INCL CAD: CPT

## 2024-11-08 PROCEDURE — 77063 BREAST TOMOSYNTHESIS BI: CPT

## 2024-12-09 NOTE — PROGRESS NOTES
Chief Complaint  Anxiety (4 week follow-up/Patient reports she can not take the Pristiq that it makes her like a zombie. ) and Depression (4 week follow-up/)        Subjective          Fabiano Piedra presents to [facility]  History of Present Illness    History of Present Illness  The patient presents for evaluation of anxiety.    She reports a rapid response to medication, having experienced significant side effects from Pristiq within 4 days of initiation. These side effects included extreme fatigue, to the point of being unable to engage in conversation with her . She has previously found Celexa to be effective but is currently unable to obtain insurance coverage for this medication. She does not believe she has an anxiety disorder and reports no current feelings of anxiety.    She takes flaxseed every 2 to 3 days, which has been beneficial. She does not have a bowel movement daily, but every 2 to 3 days. She did not have a bowel movement while on her trip, which is normal for her when she is away from home.    MEDICATIONS  Current: Pristiq, flaxseed  Past: Celexa  REFERRAL/PRE-AUTH MRN - SCAN - [place] REQUIRED CELEXA (09/06/2024)         Current Outpatient Medications   Medication Instructions    cetirizine-pseudoephedrine (ZyrTEC-D) 5-120 MG per 12 hr tablet 1 tablet, Oral, 2 Times Daily    citalopram (CELEXA) 10 mg, Oral, Daily    dicyclomine (BENTYL) 10 MG capsule TAKE TWO CAPSULES BY MOUTH FOUR TIMES A DAY BEFORE MEALS AND AT BEDTIME FOR 30 DAYS    valACYclovir (VALTREX) 500 mg, 2 Times Daily       The following portions of the patient's history were reviewed and updated as appropriate: allergies, current medications, past family history, past medical history, past social history, past surgical history, and problem list.    Objective   Vital Signs:   /79 (BP Location: Right arm, Patient Position: Sitting, Cuff Size: Adult)   Pulse 66   Temp  "97.5 °F (36.4 °C) (Temporal)   Ht 170.2 cm (67\")   Wt 87.1 kg (192 lb)   SpO2 98%   BMI 30.07 kg/m²     BP Readings from Last 3 Encounters:   12/11/24 110/79   10/17/24 115/82   08/29/24 96/58     Wt Readings from Last 3 Encounters:   12/11/24 87.1 kg (192 lb)   10/29/24 85.3 kg (188 lb)   10/17/24 84.4 kg (186 lb 1.1 oz)           Physical Exam     Appearance: No acute distress, well-nourished  Head: normocephalic, atraumatic  Eyes: extraocular movements intact, no scleral icterus, no conjunctival injection  Ears, Nose, and Throat: external ears normal, nares patent, moist mucous membranes  Cardiovascular: regular rate and rhythm. no murmurs, rubs, or gallops. no edema  Respiratory: breathing comfortably, symmetric chest rise, clear to auscultation bilaterally. No wheezes, rales, or rhonchi.  Neuro: alert and oriented to time, place, and person. Normal gait  Psych: normal mood and affect     Physical Exam        Result Review :   The following data was reviewed by: TAMI Morelos on 12/11/2024:      Results           Lab Results   Component Value Date    SARSANTIGEN Not Detected 10/17/2024    RAPFLUA Negative 10/17/2024    RAPFLUB Negative 10/17/2024    FLUAAG Not Detected 11/28/2023    FLUBAG Not Detected 11/28/2023    RAPSCRN Negative 11/28/2023    BILIRUBINUR Small (1+) (A) 04/22/2024            Assessment and Plan    Diagnoses and all orders for this visit:    1. Mood swings (Primary)  -     Ambulatory Referral to Psychiatry  -     citalopram (CeleXA) 10 MG tablet; Take 1 tablet by mouth Daily.  Dispense: 30 tablet; Refill: 1    2. JAY (generalized anxiety disorder)  -     Ambulatory Referral to Psychiatry  -     citalopram (CeleXA) 10 MG tablet; Take 1 tablet by mouth Daily.  Dispense: 30 tablet; Refill: 1    3. Need for vaccination  -     Shingrix Vaccine        Assessment & Plan  1. Anxiety.  She reports that Pristiq made her feel like a \"zombie\" and was ineffective. She has previously " responded well to Celexa but is facing insurance issues with coverage for the brand name medication. A referral to Dr. Mena, a psychiatrist, will be made for further evaluation and management of her anxiety. A note will be sent to the pharmacy indicating that she requires the brand name Celexa due to the ineffectiveness of the generic form.    2. Constipation.  She takes flaxseed every 2 to 3 days, which has been beneficial. She does not have a bowel movement daily, but every 2 to 3 days. She did not have a bowel movement while on her trip, which is normal for her when she is away from home.    3. Health maintenance.  She will receive the shingles vaccine during this visit. She is advised to take ibuprofen before bedtime to mitigate potential side effects such as body aches.    Medications Discontinued During This Encounter   Medication Reason    desvenlafaxine (Pristiq) 50 MG 24 hr tablet Historical Med - Therapy completed          Follow Up   Return for Annual physical.  Patient was given instructions and counseling regarding her condition or for health maintenance advice. Please see specific information pulled into the AVS if appropriate.       TAMI Morelos  12/12/24  16:16 EST      Patient or patient representative verbalized consent for the use of Ambient Listening during the visit with  TAMI Morelos for chart documentation. 12/12/2024  16:15 EST

## 2024-12-11 ENCOUNTER — OFFICE VISIT (OUTPATIENT)
Dept: INTERNAL MEDICINE | Facility: CLINIC | Age: 59
End: 2024-12-11
Payer: COMMERCIAL

## 2024-12-11 VITALS
HEART RATE: 66 BPM | TEMPERATURE: 97.5 F | SYSTOLIC BLOOD PRESSURE: 110 MMHG | HEIGHT: 67 IN | DIASTOLIC BLOOD PRESSURE: 79 MMHG | WEIGHT: 192 LBS | OXYGEN SATURATION: 98 % | BODY MASS INDEX: 30.13 KG/M2

## 2024-12-11 DIAGNOSIS — F41.1 GAD (GENERALIZED ANXIETY DISORDER): ICD-10-CM

## 2024-12-11 DIAGNOSIS — Z23 NEED FOR VACCINATION: ICD-10-CM

## 2024-12-11 DIAGNOSIS — R45.86 MOOD SWINGS: Primary | ICD-10-CM

## 2024-12-11 PROCEDURE — 90750 HZV VACC RECOMBINANT IM: CPT | Performed by: NURSE PRACTITIONER

## 2024-12-11 PROCEDURE — 99214 OFFICE O/P EST MOD 30 MIN: CPT | Performed by: NURSE PRACTITIONER

## 2024-12-11 NOTE — LETTER
[facility]  Vaccine Consent Form    Patient Name:  Fabiano Piedra  Patient :  1965     Vaccine(s) Ordered    Shingrix Vaccine        Screening Checklist  The following questions should be completed prior to vaccination. If you answer “yes” to any question, it does not necessarily mean you should not be vaccinated. It just means we may need to clarify or ask more questions. If a question is unclear, please ask your healthcare provider to explain it.    Yes No   Any fever or moderate to severe illness today (mild illness and/or antibiotic treatment are not contraindications)?     Do you have a history of a serious reaction to any previous vaccinations, such as anaphylaxis, encephalopathy within 7 days, Guillain-Monteview syndrome within 6 weeks, seizure?     Have you received any live vaccine(s) (e.g MMR, BRONWYN) or any other vaccines in the last month (to ensure duplicate doses aren't given)?     Do you have an anaphylactic allergy to latex (DTaP, DTaP-IPV, Hep A, Hep B, MenB, RV, Td, Tdap), baker’s yeast (Hep B, HPV), polysorbates (RSV, nirsevimab, PCV 20, Rotavirrus, Tdap, Shingrix), or gelatin (BRONWYN, MMR)?     Do you have an anaphylactic allergy to neomycin (Rabies, BRONWYN, MMR, IPV, Hep A), polymyxin B (IPV), or streptomycin (IPV)?      Any cancer, leukemia, AIDS, or other immune system disorder? (BRONWYN, MMR, RV)     Do you have a parent, brother, or sister with an immune system problem (if immune competence of vaccine recipient clinically verified, can proceed)? (MMR, BRONWYN)     Any recent steroid treatments for >2 weeks, chemotherapy, or radiation treatment? (BRONWYN, MMR)     Have you received antibody-containing blood transfusions or IVIG in the past 11 months (recommended interval is dependent on product)? (MMR, BRONWYN)     Have you taken antiviral drugs (acyclovir, famciclovir, valacyclovir for BRONWYN) in the last 24 or 48 hours, respectively?      Are you pregnant or planning to become pregnant within 1 month?  "(BRONWYN, MMR, HPV, IPV, MenB, Abrexvy; For Hep B- refer to Engerix-B; For RSV - Abrysvo is indicated for 32-36 weeks of pregnancy from September to January)     For infants, have you ever been told your child has had intussusception or a medical emergency involving obstruction of the intestine (Rotavirus)? If not for an infant, can skip this question.         *Ordering Physicians/APC should be consulted if \"yes\" is checked by the patient or guardian above.  I have received, read, and understand the Vaccine Information Statement (VIS) for each vaccine ordered.  I have considered my or my child's health status as well as the health status of my close contacts.  I have taken the opportunity to discuss my vaccine questions with my or my child's health care provider.   I have requested that the ordered vaccine(s) be given to me or my child.  I understand the benefits and risks of the vaccines.  I understand that I should remain in the clinic for 15 minutes after receiving the vaccine(s).  _________________________________________________________  Signature of Patient or Parent/Legal Guardian ____________________  Date     "

## 2024-12-12 RX ORDER — CITALOPRAM HYDROBROMIDE 10 MG/1
10 TABLET ORAL DAILY
Qty: 30 TABLET | Refills: 1 | Status: SHIPPED | OUTPATIENT
Start: 2024-12-12

## 2024-12-13 ENCOUNTER — TELEPHONE (OUTPATIENT)
Dept: INTERNAL MEDICINE | Facility: CLINIC | Age: 59
End: 2024-12-13

## 2024-12-13 NOTE — TELEPHONE ENCOUNTER
Caller: Fabiano Piedra    Relationship to patient: Self    Best call back number: [phone]     Patient is needing: PATIENT STATES THAT SHE WOULD LIKE A CALL ASAP BECAUSE SHE HAS MOR INFORMATION REGARDING THIS MEDICATION

## 2024-12-13 NOTE — TELEPHONE ENCOUNTER
Spoke with the pharmacy at [facility].  He said they have the medication ready for Celexa NAME BRAND.   They said a prior auth is needed and they have sent it over for me to complete.    Pharmacy is aware to disregard Pristiq that had been sent in prior.      I have checked our faxes and my folders and do not see the form for the prior auth.

## 2024-12-13 NOTE — TELEPHONE ENCOUNTER
Spoke with patient. Transferred from front.     Made patient aware we sent in Celexa brand name to the pharmacy at her appointment.   Made her aware I have not yet been notified any prior auth  is needed for insurance.   Made patient aware psych referral was placed at appointment so that it did not get missed and that psych is out months for new patient appointment.  Patient told me she will not see a psychiatrist.     Patient made aware that  I will contact the pharmacy to discuss the medication with them.   Made patient aware that I am in clinic 4 days a week and not always able to answer calls immediately or file paperwork immediately, but I will work on this as soon as I can.

## 2024-12-13 NOTE — TELEPHONE ENCOUNTER
Caller: Fabiano Piedra    Relationship: Self    Best call back number: [phone]    What was the call regarding: PATIENT HAS QUESTIONS REGARDING A REFERRAL AND MEDICATION PRESCRIBED BY MENG SHE WOULD LIKE TO ADDRESS.

## 2024-12-13 NOTE — TELEPHONE ENCOUNTER
Spoke with patient. Verified .   Made aware Celexa BRAND NAME was approved by insurance.     Patient does not want to Psych at this time.

## 2024-12-13 NOTE — TELEPHONE ENCOUNTER
Patient called she states she has scheduled her appt and received notification from [facility] pharmacy. She's concerned the medication is not the one discussed during her visit?

## 2024-12-18 ENCOUNTER — TELEPHONE (OUTPATIENT)
Dept: INTERNAL MEDICINE | Facility: CLINIC | Age: 59
End: 2024-12-18
Payer: COMMERCIAL

## 2024-12-18 RX ORDER — CETIRIZINE HYDROCHLORIDE, PSEUDOEPHEDRINE HYDROCHLORIDE 5; 120 MG/1; MG/1
1 TABLET, FILM COATED, EXTENDED RELEASE ORAL 2 TIMES DAILY
Qty: 30 TABLET | Refills: 0 | Status: SHIPPED | OUTPATIENT
Start: 2024-12-18

## 2024-12-18 NOTE — TELEPHONE ENCOUNTER
Caller: Fabiano Piedra    Relationship: Self    Best call back number: [phone]     What medication are you requesting: CITALOPRAM (CELEXA) 20 MG TABLET    What are your current symptoms: ANXIETY      If a prescription is needed, what is your preferred pharmacy and phone number: [facility] [id number]  [place], [place] - [address] AT [facility] ( [address]) [address] [facility] [phone] [facility] [phone] FX     Additional notes: PATIENT CURRENTLY HAS PRESCRIPTION FOR 10 MG TABLETS OF MEDICATION. PATIENT'S PHARMACY IS ONLY ABLE TO GET THE 20 MG TABLETS. PATIENT UNABLE TO SWITCH PHARMACY DUE TO [facility] BEING THE ONLY ONE IN THE AREA THAT WILL ACCEPT HER INSURANCE WITHOUT A PRICE INCREASE. PATIENT NEEDING TO HAVE 20 MG TABLET CALLED IN.

## 2024-12-18 NOTE — TELEPHONE ENCOUNTER
Caller: Fabiano Piedralacey    Relationship: Self    Best call back number: [phone]     Requested Prescriptions:   Requested Prescriptions     Pending Prescriptions Disp Refills    cetirizine-pseudoephedrine (ZyrTEC-D) 5-120 MG per 12 hr tablet [Pharmacy Med Name: CETIRIZINE-PSE ER 5-120 MG TAB] 30 tablet 0     Sig: TAKE 1 TABLET BY MOUTH 2 TIMES A DAY        Pharmacy where request should be sent: [facility] [id number]  [place], [place] - [address] AT [facility] ( [address] [facility] [phone] [facility] [phone]      Last office visit with prescribing clinician: 12/11/2024   Last telemedicine visit with prescribing clinician: Visit date not found   Next office visit with prescribing clinician: 12/18/2024     Additional details provided by patient: PATIENT NEEDING A 90 DAY SUPPLY. PATIENT UNABLE TO GET INTO ALLERGIST FOR A FEW MONTHS DUE TO HIM SWITCHING TO A NEW PRACTICE. PLEASE ADVISE.    Does the patient have less than a 3 day supply:  [] Yes  [x] No    Would you like a call back once the refill request has been completed: [] Yes [] No    If the office needs to give you a call back, can they leave a voicemail: [] Yes [] No    Anselmo Osman Rep   12/18/24 16:13 EST

## 2024-12-19 NOTE — TELEPHONE ENCOUNTER
[id number] - [place], [place] - [address] AT [facility] [address] - [phone]  - [phone] FX   [address] [place] [address]       They should be able to order

## 2024-12-20 RX ORDER — CITALOPRAM HYDROBROMIDE 20 MG/1
10 TABLET ORAL DAILY
Qty: 15 TABLET | Refills: 0 | Status: SHIPPED | OUTPATIENT
Start: 2024-12-20

## 2024-12-20 NOTE — TELEPHONE ENCOUNTER
Discussed with Constance in person. Pharmacist said I have to do a PA for the 20 mg before they will do anything regarding order 10 mg.....  They do not want to order 10 mg due to it being over $1,000 and the patient may not pay the copay after what insurance covers and

## 2025-01-13 ENCOUNTER — PATIENT MESSAGE (OUTPATIENT)
Dept: INTERNAL MEDICINE | Facility: CLINIC | Age: 60
End: 2025-01-13
Payer: COMMERCIAL

## 2025-01-13 DIAGNOSIS — R45.86 MOOD SWINGS: ICD-10-CM

## 2025-01-13 DIAGNOSIS — F41.1 GAD (GENERALIZED ANXIETY DISORDER): Primary | ICD-10-CM

## 2025-01-13 RX ORDER — CITALOPRAM HYDROBROMIDE 20 MG/1
10 TABLET ORAL DAILY
Qty: 15 TABLET | Refills: 0 | Status: SHIPPED | OUTPATIENT
Start: 2025-01-13

## 2025-01-22 RX ORDER — CETIRIZINE HYDROCHLORIDE, PSEUDOEPHEDRINE HYDROCHLORIDE 5; 120 MG/1; MG/1
1 TABLET, FILM COATED, EXTENDED RELEASE ORAL 2 TIMES DAILY
Qty: 24 TABLET | Refills: 1 | Status: SHIPPED | OUTPATIENT
Start: 2025-01-22

## 2025-02-06 DIAGNOSIS — R45.86 MOOD SWINGS: ICD-10-CM

## 2025-02-06 DIAGNOSIS — F41.1 GAD (GENERALIZED ANXIETY DISORDER): ICD-10-CM

## 2025-02-06 RX ORDER — CITALOPRAM HYDROBROMIDE 20 MG/1
10 TABLET ORAL DAILY
Qty: 15 TABLET | Refills: 0 | Status: SHIPPED | OUTPATIENT
Start: 2025-02-06

## 2025-02-10 ENCOUNTER — TELEPHONE (OUTPATIENT)
Dept: INTERNAL MEDICINE | Facility: CLINIC | Age: 60
End: 2025-02-10
Payer: COMMERCIAL

## 2025-02-10 DIAGNOSIS — R45.86 MOOD SWINGS: ICD-10-CM

## 2025-02-10 DIAGNOSIS — F41.1 GAD (GENERALIZED ANXIETY DISORDER): ICD-10-CM

## 2025-02-10 NOTE — TELEPHONE ENCOUNTER
Pharmacy called asking if it was okay to generic brand on Celexa, they said brand name is not covered. gave the okay for generic.

## 2025-02-11 RX ORDER — CITALOPRAM HYDROBROMIDE 20 MG/1
10 TABLET ORAL DAILY
Qty: 45 TABLET | Refills: 0 | Status: SHIPPED | OUTPATIENT
Start: 2025-02-11

## 2025-02-12 DIAGNOSIS — R45.86 MOOD SWINGS: ICD-10-CM

## 2025-02-12 DIAGNOSIS — F41.1 GAD (GENERALIZED ANXIETY DISORDER): ICD-10-CM

## 2025-02-12 RX ORDER — CITALOPRAM HYDROBROMIDE 20 MG/1
10 TABLET ORAL DAILY
Qty: 45 TABLET | Refills: 0 | OUTPATIENT
Start: 2025-02-12

## 2025-02-17 ENCOUNTER — OFFICE VISIT (OUTPATIENT)
Dept: ORTHOPEDIC SURGERY | Facility: CLINIC | Age: 60
End: 2025-02-17
Payer: COMMERCIAL

## 2025-02-17 ENCOUNTER — TELEPHONE (OUTPATIENT)
Dept: ORTHOPEDIC SURGERY | Facility: CLINIC | Age: 60
End: 2025-02-17

## 2025-02-17 VITALS — DIASTOLIC BLOOD PRESSURE: 77 MMHG | OXYGEN SATURATION: 94 % | SYSTOLIC BLOOD PRESSURE: 130 MMHG | HEART RATE: 65 BPM

## 2025-02-17 DIAGNOSIS — M70.62 TROCHANTERIC BURSITIS OF LEFT HIP: Primary | ICD-10-CM

## 2025-02-17 DIAGNOSIS — M70.61 TROCHANTERIC BURSITIS OF RIGHT HIP: ICD-10-CM

## 2025-02-17 RX ADMIN — TRIAMCINOLONE ACETONIDE 40 MG: 40 INJECTION, SUSPENSION INTRA-ARTICULAR; INTRAMUSCULAR at 14:58

## 2025-02-17 RX ADMIN — LIDOCAINE HYDROCHLORIDE 5 ML: 10 INJECTION, SOLUTION INFILTRATION; PERINEURAL at 14:58

## 2025-02-17 NOTE — TELEPHONE ENCOUNTER
LT VM TO INFORM PT SCHEDULE TODAY NEED TO BE RESCHEDULED, DUE TO DR ALVAREZ WILL NOT IN BE IN OFFICE THIS AFTERNOON. OK FOR HUB TO RESCHEDULE TO NEXT AVAILABILITY OR CINDY GEORGE) HAS OPENINGS TODAY.

## 2025-02-17 NOTE — PROGRESS NOTES
Chief Complaint  Follow-up of the Right Hip and Follow-up of the Left Hip    Subjective          Fabiano Piedra presents to [facility]   History of Present Illness    Fabiano Piedra presents today for a follow-up of her bilateral hips.  Patient has bilateral hip trochanteric bursitis that we are treating conservatively.  Today, patient states that her hip pain started again about a month ago.  She reports good relief from her previous injections.  She has been doing her home exercises.  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      Allergies   Allergen Reactions    Macrobid [Nitrofurantoin] Hives    Banana Unknown - High Severity    Beef-Derived Drug Products Unknown - High Severity    Dairy Aid [Tilactase] Unknown - Low Severity        Social History     Socioeconomic History    Marital status:    Tobacco Use    Smoking status: Never     Passive exposure: Past    Smokeless tobacco: Never   Vaping Use    Vaping status: Never Used   Substance and Sexual Activity    Alcohol use: Yes     Alcohol/week: 2.0 standard drinks of alcohol     Types: 1 Glasses of wine, 1 Cans of beer per week     Comment: OCCASIONALLY    Drug use: Never    Sexual activity: Yes     Partners: Male     Birth control/protection: Post-menopausal, Vasectomy        I reviewed the patient's chief complaint, history of present illness, review of systems, past medical history, surgical history, family history, social history, medications, and allergy list.     REVIEW OF SYSTEMS    Constitutional: Denies fevers, chills, weight loss  Cardiovascular: Denies chest pain, shortness of breath  Skin: Denies rashes, acute skin changes  Neurologic: Denies headache, loss of consciousness  MSK: Right hip pain.  Left hip pain.      Objective   Vital Signs:   /77   Pulse 65   SpO2 94%     There is no height or weight on file to calculate BMI.    Physical Exam    General: Alert. No  acute distress.   Right lower extremity: Tenderness to palpation of the greater trochanteric bursa.  Hip flexion intact.  5 out of 5 hip flexion.  5 out of 5 hip abduction.  No groin pain with passive hip range of motion.  No groin pain with passive logroll the hip.  Knee extensor mechanism intact.  Calf soft, nontender.  Demonstrates active ankle plantarflexion and dorsiflexion.  Palpable pedal pulses.    Left lower extremity: Tenderness to palpation of the greater trochanteric bursa.  Hip flexion intact.  5 out of 5 hip flexion.  5 out of 5 hip abduction.  No groin pain with passive hip range of motion.  No groin pain with passive logroll the hip.  Knee extensor mechanism intact.  Calf soft, nontender.  Demonstrates active ankle plantarflexion and dorsiflexion.  Palpable pedal pulses.    Large Joint Arthrocentesis  Date/Time: 2/17/2025 2:58 PM  Consent given by: patient  Site marked: site marked  Timeout: Immediately prior to procedure a time out was called to verify the correct patient, procedure, equipment, support staff and site/side marked as required   Supporting Documentation  Indications: pain   Procedure Details  Location: -   Location: right hip.Needle gauge: 21 G.  Medications administered: 5 mL lidocaine 1 %; 40 mg triamcinolone acetonide 40 MG/ML  Patient tolerance: patient tolerated the procedure well with no immediate complications      Large Joint Arthrocentesis  Date/Time: 2/17/2025 2:58 PM  Consent given by: patient  Site marked: site marked  Timeout: Immediately prior to procedure a time out was called to verify the correct patient, procedure, equipment, support staff and site/side marked as required   Supporting Documentation  Indications: pain   Procedure Details  Location: -   Location: left hip.Needle gauge: 21 G.  Medications administered: 5 mL lidocaine 1 %; 40 mg triamcinolone acetonide 40 MG/ML  Patient tolerance: patient tolerated the procedure well with no immediate  complications      This injection documentation was Scribed for Niya Benavides PA-C by Sherin Boucher MA.  02/17/25   14:59 EST    Imaging Results (Most Recent)       None                   Assessment and Plan    Diagnoses and all orders for this visit:    1. Trochanteric bursitis of left hip (Primary)    2. Trochanteric bursitis of right hip    Other orders  -     Large Joint Arthrocentesis  -     Large Joint Arthrocentesis        Fabiano Piedra presents today to follow-up with her bilateral hip trochanteric bursitis and are treating conservatively.  Patient instructed to continue with home exercises.  We discussed activity modification as able. We discussed the risks and benefits with the patient regarding right hip trochanteric bursitis steroid and left hip trochanteric bursitis steroid injections. Patient understood and elected to proceed. Patient tolerated injections well with no complications.         Patient will follow up as needed.    Call or return if symptoms worsen or patient has any concerns.       Follow Up   Return if symptoms worsen or fail to improve.  Patient was given instructions and counseling regarding her condition or for health maintenance advice. Please see specific information pulled into the AVS if appropriate.     Niya Benavides PA-C  02/19/25  11:30 EST

## 2025-02-19 ENCOUNTER — TELEPHONE (OUTPATIENT)
Dept: INTERNAL MEDICINE | Facility: CLINIC | Age: 60
End: 2025-02-19
Payer: COMMERCIAL

## 2025-02-19 RX ORDER — LIDOCAINE HYDROCHLORIDE 10 MG/ML
5 INJECTION, SOLUTION INFILTRATION; PERINEURAL
Status: COMPLETED | OUTPATIENT
Start: 2025-02-17 | End: 2025-02-17

## 2025-02-19 RX ORDER — TRIAMCINOLONE ACETONIDE 40 MG/ML
40 INJECTION, SUSPENSION INTRA-ARTICULAR; INTRAMUSCULAR
Status: COMPLETED | OUTPATIENT
Start: 2025-02-17 | End: 2025-02-17

## 2025-02-19 NOTE — TELEPHONE ENCOUNTER
Patient called, she was having trouble picking up her Celexa. It looks like [facility] couldn't fill it due to [facility] having a prescription waiting for . I called [facility] to cancel the script that was sen there. [facility] said the patient shouldn't have any trouble picking up meds from them now. Patient updated, no further questions or concerns a this time.

## 2025-03-04 ENCOUNTER — TELEPHONE (OUTPATIENT)
Dept: INTERNAL MEDICINE | Facility: CLINIC | Age: 60
End: 2025-03-04
Payer: COMMERCIAL

## 2025-03-04 DIAGNOSIS — R45.86 MOOD SWINGS: ICD-10-CM

## 2025-03-04 DIAGNOSIS — F41.1 GAD (GENERALIZED ANXIETY DISORDER): Primary | ICD-10-CM

## 2025-03-04 NOTE — TELEPHONE ENCOUNTER
"    Caller: Fabiano Piedra    Relationship: Self    Best call back number: [phone]     What medication are you requesting: citalopram (CeleXA) 20 MG tablet       If a prescription is needed, what is your preferred pharmacy and phone number: [facility] #[id number] - [place], [place] - [address] [facility] - [phone] [facility] [phone] FX     Additional notes:PATIENT STATES THE PHARMACY AND INSURANCE SAID THE PRESCRIPTION ORDER NEEDS TO BE SENT OVER AS ONLY \"CELEXA 20 MG TABLET\"    PLEASE DO NOT ADD THE GENERIC NAME OR \"NAME BRAND ONLY\"  (IF THIS IS ON THE ORDER HER COPAY GOES UP TO $70 OUT OF POCKET)    IF THIS IS ON THE ORDER PATIENT CANNOT GET THE LOWEST COPAY.           "

## 2025-03-05 RX ORDER — CITALOPRAM HYDROBROMIDE 20 MG/1
10 TABLET ORAL DAILY
Qty: 15 TABLET | Refills: 0 | Status: SHIPPED | OUTPATIENT
Start: 2025-03-05

## 2025-03-24 NOTE — PROGRESS NOTES
"Chief Complaint  Annual Exam    Subjective          Fabiano Piedra presents to [facility]  Anxiety  Presents for initial visit.  Symptoms include decreased concentration and irritability.  Patient reports no chest pain, compulsions, confusion, depressed mood, dizziness, dry mouth, excessive worry, feeling of choking, hyperventilation, impotence, insomnia, muscle tension, nausea, obsessions, palpitations, panic, restlessness, shortness of breath, suicidal ideas or malaise/fatigue.       History of Present Illness          Current Outpatient Medications   Medication Instructions    cetirizine-pseudoephedrine (ZyrTEC-D) 5-120 MG per 12 hr tablet 1 tablet, Oral, 2 Times Daily    citalopram (CELEXA) 10 mg, Oral, Daily    dicyclomine (BENTYL) 10 MG capsule TAKE TWO CAPSULES BY MOUTH FOUR TIMES A DAY BEFORE MEALS AND AT BEDTIME FOR 30 DAYS    valACYclovir (VALTREX) 500 mg, 2 Times Daily       The following portions of the patient's history were reviewed and updated as appropriate: allergies, current medications, past family history, past medical history, past social history, past surgical history, and problem list.    Objective   Vital Signs:   /79   Pulse 94   Temp 97.9 °F (36.6 °C)   Ht 170.2 cm (67\")   Wt 84.4 kg (186 lb)   SpO2 98%   BMI 29.13 kg/m²     BP Readings from Last 3 Encounters:   03/25/25 119/79   02/17/25 130/77   12/11/24 110/79     Wt Readings from Last 3 Encounters:   03/25/25 84.4 kg (186 lb)   12/11/24 87.1 kg (192 lb)   10/29/24 85.3 kg (188 lb)           Physical Exam     Appearance: No acute distress, well-nourished  Head: normocephalic, atraumatic  Eyes: extraocular movements intact, no scleral icterus, no conjunctival injection  Ears, Nose, and Throat: external ears normal, nares patent, moist mucous membranes  Cardiovascular: regular rate and rhythm. no murmurs, rubs, or gallops. no edema  Respiratory: breathing comfortably, " symmetric chest rise, clear to auscultation bilaterally. No wheezes, rales, or rhonchi.  Neuro: alert and oriented to time, place, and person. Normal gait  Psych: normal mood and affect     Physical Exam        Result Review :   The following data was reviewed by: TAMI Morelos on 03/25/2025:      Results           Lab Results   Component Value Date    SARSANTIGEN Not Detected 10/17/2024    RAPFLUA Negative 10/17/2024    RAPFLUB Negative 10/17/2024    FLUAAG Not Detected 11/28/2023    FLUBAG Not Detected 11/28/2023    RAPSCRN Negative 11/28/2023    BILIRUBINUR Small (1+) (A) 04/22/2024            Assessment and Plan    Diagnoses and all orders for this visit:    1. Annual physical exam (Primary)  -     TSH Rfx On Abnormal To Free T4  -     Lipid Panel  -     Comprehensive Metabolic Panel  -     CBC & Differential    2. Screening for thyroid disorder  -     TSH Rfx On Abnormal To Free T4    3. Screening for lipid disorders  -     Lipid Panel    4. Encounter for immunization  -     Shingrix Vaccine  -     Pneumococcal Conjugate Vaccine 20-Valent (PCV20)    5. Need for vaccination for Strep pneumoniae    6. JAY (generalized anxiety disorder)  -     citalopram (CeleXA) 20 MG tablet; Take 0.5 tablets by mouth Daily.  Dispense: 15 tablet; Refill: 2    7. Mood swings  -     citalopram (CeleXA) 20 MG tablet; Take 0.5 tablets by mouth Daily.  Dispense: 15 tablet; Refill: 2      Advised on diet, physical activity, sunscreen, helmet, texting and driving, etc    Assessment & Plan      Medications Discontinued During This Encounter   Medication Reason    citalopram (CeleXA) 20 MG tablet Reorder          Follow Up   Return in about 6 months (around 9/25/2025) for Depression, Anxiety.  Patient was given instructions and counseling regarding her condition or for health maintenance advice. Please see specific information pulled into the AVS if appropriate.       TAMI Morelos  03/25/25  15:58 EDT      Patient  or patient representative verbalized consent for the use of Ambient Listening during the visit with  TAMI Morelos for chart documentation. 3/25/2025  13:47 EDT

## 2025-03-25 ENCOUNTER — OFFICE VISIT (OUTPATIENT)
Dept: INTERNAL MEDICINE | Facility: CLINIC | Age: 60
End: 2025-03-25
Payer: COMMERCIAL

## 2025-03-25 VITALS
DIASTOLIC BLOOD PRESSURE: 79 MMHG | OXYGEN SATURATION: 98 % | HEART RATE: 94 BPM | WEIGHT: 186 LBS | BODY MASS INDEX: 29.19 KG/M2 | TEMPERATURE: 97.9 F | SYSTOLIC BLOOD PRESSURE: 119 MMHG | HEIGHT: 67 IN

## 2025-03-25 DIAGNOSIS — F41.1 GAD (GENERALIZED ANXIETY DISORDER): ICD-10-CM

## 2025-03-25 DIAGNOSIS — Z13.220 SCREENING FOR LIPID DISORDERS: ICD-10-CM

## 2025-03-25 DIAGNOSIS — R45.86 MOOD SWINGS: ICD-10-CM

## 2025-03-25 DIAGNOSIS — Z13.29 SCREENING FOR THYROID DISORDER: ICD-10-CM

## 2025-03-25 DIAGNOSIS — Z23 NEED FOR VACCINATION FOR STREP PNEUMONIAE: ICD-10-CM

## 2025-03-25 DIAGNOSIS — Z23 ENCOUNTER FOR IMMUNIZATION: ICD-10-CM

## 2025-03-25 DIAGNOSIS — Z00.00 ANNUAL PHYSICAL EXAM: Primary | ICD-10-CM

## 2025-03-25 PROBLEM — Z12.11 ENCOUNTER FOR SCREENING FOR MALIGNANT NEOPLASM OF COLON: Status: RESOLVED | Noted: 2022-07-27 | Resolved: 2025-03-25

## 2025-03-25 LAB
ALBUMIN SERPL-MCNC: 4.3 G/DL (ref 3.5–5.2)
ALBUMIN/GLOB SERPL: 1.6 G/DL
ALP SERPL-CCNC: 81 U/L (ref 39–117)
ALT SERPL W P-5'-P-CCNC: 22 U/L (ref 1–33)
ANION GAP SERPL CALCULATED.3IONS-SCNC: 14 MMOL/L (ref 5–15)
AST SERPL-CCNC: 24 U/L (ref 1–32)
BASOPHILS # BLD AUTO: 0.04 10*3/MM3 (ref 0–0.2)
BASOPHILS NFR BLD AUTO: 1 % (ref 0–1.5)
BILIRUB SERPL-MCNC: 0.4 MG/DL (ref 0–1.2)
BUN SERPL-MCNC: 16 MG/DL (ref 8–23)
BUN/CREAT SERPL: 20.3 (ref 7–25)
CALCIUM SPEC-SCNC: 9.5 MG/DL (ref 8.6–10.5)
CHLORIDE SERPL-SCNC: 103 MMOL/L (ref 98–107)
CHOLEST SERPL-MCNC: 220 MG/DL (ref 0–200)
CO2 SERPL-SCNC: 25 MMOL/L (ref 22–29)
CREAT SERPL-MCNC: 0.79 MG/DL (ref 0.57–1)
DEPRECATED RDW RBC AUTO: 45.1 FL (ref 37–54)
EGFRCR SERPLBLD CKD-EPI 2021: 84.7 ML/MIN/1.73
EOSINOPHIL # BLD AUTO: 0.07 10*3/MM3 (ref 0–0.4)
EOSINOPHIL NFR BLD AUTO: 1.7 % (ref 0.3–6.2)
ERYTHROCYTE [DISTWIDTH] IN BLOOD BY AUTOMATED COUNT: 13.6 % (ref 12.3–15.4)
GLOBULIN UR ELPH-MCNC: 2.7 GM/DL
GLUCOSE SERPL-MCNC: 80 MG/DL (ref 65–99)
HCT VFR BLD AUTO: 43.3 % (ref 34–46.6)
HDLC SERPL-MCNC: 73 MG/DL (ref 40–60)
HGB BLD-MCNC: 14.2 G/DL (ref 12–15.9)
IMM GRANULOCYTES # BLD AUTO: 0.01 10*3/MM3 (ref 0–0.05)
IMM GRANULOCYTES NFR BLD AUTO: 0.2 % (ref 0–0.5)
LDLC SERPL CALC-MCNC: 139 MG/DL (ref 0–100)
LDLC/HDLC SERPL: 1.88 {RATIO}
LYMPHOCYTES # BLD AUTO: 1.2 10*3/MM3 (ref 0.7–3.1)
LYMPHOCYTES NFR BLD AUTO: 28.6 % (ref 19.6–45.3)
MCH RBC QN AUTO: 30 PG (ref 26.6–33)
MCHC RBC AUTO-ENTMCNC: 32.8 G/DL (ref 31.5–35.7)
MCV RBC AUTO: 91.4 FL (ref 79–97)
MONOCYTES # BLD AUTO: 0.42 10*3/MM3 (ref 0.1–0.9)
MONOCYTES NFR BLD AUTO: 10 % (ref 5–12)
NEUTROPHILS NFR BLD AUTO: 2.45 10*3/MM3 (ref 1.7–7)
NEUTROPHILS NFR BLD AUTO: 58.5 % (ref 42.7–76)
NRBC BLD AUTO-RTO: 0 /100 WBC (ref 0–0.2)
PLATELET # BLD AUTO: 330 10*3/MM3 (ref 140–450)
PMV BLD AUTO: 9.9 FL (ref 6–12)
POTASSIUM SERPL-SCNC: 4.1 MMOL/L (ref 3.5–5.2)
PROT SERPL-MCNC: 7 G/DL (ref 6–8.5)
RBC # BLD AUTO: 4.74 10*6/MM3 (ref 3.77–5.28)
SODIUM SERPL-SCNC: 142 MMOL/L (ref 136–145)
TRIGL SERPL-MCNC: 49 MG/DL (ref 0–150)
TSH SERPL DL<=0.05 MIU/L-ACNC: 2.26 UIU/ML (ref 0.27–4.2)
VLDLC SERPL-MCNC: 8 MG/DL (ref 5–40)
WBC NRBC COR # BLD AUTO: 4.19 10*3/MM3 (ref 3.4–10.8)

## 2025-03-25 PROCEDURE — 80050 GENERAL HEALTH PANEL: CPT | Performed by: NURSE PRACTITIONER

## 2025-03-25 PROCEDURE — 80061 LIPID PANEL: CPT | Performed by: NURSE PRACTITIONER

## 2025-03-25 RX ORDER — CITALOPRAM HYDROBROMIDE 20 MG/1
10 TABLET ORAL DAILY
Qty: 15 TABLET | Refills: 2 | Status: SHIPPED | OUTPATIENT
Start: 2025-03-25

## 2025-03-25 NOTE — LETTER
[facility]  Vaccine Consent Form    Patient Name:  Fabiano Piedra  Patient :  1965     Vaccine(s) Ordered    Shingrix Vaccine  Pneumococcal Conjugate Vaccine 20-Valent (PCV20)        Screening Checklist  The following questions should be completed prior to vaccination. If you answer “yes” to any question, it does not necessarily mean you should not be vaccinated. It just means we may need to clarify or ask more questions. If a question is unclear, please ask your healthcare provider to explain it.    Yes No   Any fever or moderate to severe illness today (mild illness and/or antibiotic treatment are not contraindications)?     Do you have a history of a serious reaction to any previous vaccinations, such as anaphylaxis, encephalopathy within 7 days, Guillain-Mozelle syndrome within 6 weeks, seizure?     Have you received any live vaccine(s) (e.g MMR, BRONWYN) or any other vaccines in the last month (to ensure duplicate doses aren't given)?     Do you have an anaphylactic allergy to latex (DTaP, DTaP-IPV, Hep A, Hep B, MenB, RV, Td, Tdap), baker’s yeast (Hep B, HPV), polysorbates (RSV, nirsevimab, PCV 20, Rotavirrus, Tdap, Shingrix), or gelatin (BRONWYN, MMR)?     Do you have an anaphylactic allergy to neomycin (Rabies, BRONWNY, MMR, IPV, Hep A), polymyxin B (IPV), or streptomycin (IPV)?      Any cancer, leukemia, AIDS, or other immune system disorder? (BRONWYN, MMR, RV)     Do you have a parent, brother, or sister with an immune system problem (if immune competence of vaccine recipient clinically verified, can proceed)? (MMR, BRONWYN)     Any recent steroid treatments for >2 weeks, chemotherapy, or radiation treatment? (BRONWYN, MMR)     Have you received antibody-containing blood transfusions or IVIG in the past 11 months (recommended interval is dependent on product)? (MMR, BRONWYN)     Have you taken antiviral drugs (acyclovir, famciclovir, valacyclovir for BRONWYN) in the last 24 or 48 hours, respectively?      Are you  "pregnant or planning to become pregnant within 1 month? (BRONWYN, MMR, HPV, IPV, MenB, Abrexvy; For Hep B- refer to Engerix-B; For RSV - Abrysvo is indicated for 32-36 weeks of pregnancy from September to January)     For infants, have you ever been told your child has had intussusception or a medical emergency involving obstruction of the intestine (Rotavirus)? If not for an infant, can skip this question.         *Ordering Physicians/APC should be consulted if \"yes\" is checked by the patient or guardian above.  I have received, read, and understand the Vaccine Information Statement (VIS) for each vaccine ordered.  I have considered my or my child's health status as well as the health status of my close contacts.  I have taken the opportunity to discuss my vaccine questions with my or my child's health care provider.   I have requested that the ordered vaccine(s) be given to me or my child.  I understand the benefits and risks of the vaccines.  I understand that I should remain in the clinic for 15 minutes after receiving the vaccine(s).  _________________________________________________________  Signature of Patient or Parent/Legal Guardian ____________________  Date     "

## 2025-04-21 ENCOUNTER — LAB REQUISITION (OUTPATIENT)
Dept: LAB | Facility: HOSPITAL | Age: 60
End: 2025-04-21
Payer: COMMERCIAL

## 2025-04-21 DIAGNOSIS — R30.0 DYSURIA: ICD-10-CM

## 2025-04-21 PROCEDURE — 87086 URINE CULTURE/COLONY COUNT: CPT | Performed by: OBSTETRICS & GYNECOLOGY

## 2025-04-23 LAB — BACTERIA SPEC AEROBE CULT: NO GROWTH

## 2025-06-16 ENCOUNTER — TELEMEDICINE (OUTPATIENT)
Dept: INTERNAL MEDICINE | Facility: CLINIC | Age: 60
End: 2025-06-16
Payer: COMMERCIAL

## 2025-06-16 DIAGNOSIS — J01.00 ACUTE NON-RECURRENT MAXILLARY SINUSITIS: Primary | ICD-10-CM

## 2025-06-16 DIAGNOSIS — J30.9 ALLERGIC RHINITIS, UNSPECIFIED SEASONALITY, UNSPECIFIED TRIGGER: ICD-10-CM

## 2025-06-16 PROBLEM — F41.9 ANXIETY: Status: ACTIVE | Noted: 2025-06-16

## 2025-06-16 PROCEDURE — 99214 OFFICE O/P EST MOD 30 MIN: CPT | Performed by: INTERNAL MEDICINE

## 2025-06-16 RX ORDER — MONTELUKAST SODIUM 10 MG/1
10 TABLET ORAL NIGHTLY
Qty: 90 TABLET | Refills: 3 | Status: SHIPPED | OUTPATIENT
Start: 2025-06-16

## 2025-06-16 NOTE — PROGRESS NOTES
Mode of Visit: Video via [employer]  Location of patient: home  Physician's location is at clinic (Internal Medicine & Pediatric[facility] clinic at [address] [facility] [place] [address] [address]).  You have chosen to receive care through a telehealth visit.  The patient has signed the video visit consent form.  The visit included audio and video interaction.      Chief Complaint  URI symptoms    Subjective          Fabiano Piedra presents to [facility]    HPI  History of Present Illness  The patient presents via virtual consultation for evaluation of an upper respiratory bacterial infection.    The patient reports symptoms consistent with those experienced by their grandsons, who were recently diagnosed with an upper respiratory bacterial infection. The symptoms include pharyngitis, significant rhinorrhea, cough, and pyrexia. The patient describes the sensation as being localized to the head rather than the chest. Symptom onset was on 06/14/2025, with increased severity noted on 06/15/2025. Additionally, the patient reports experiencing myalgia.    The patient recalls having influenza approximately one month ago, which required three weeks for recovery. Their spouse, who also contracted influenza, remains symptomatic. The patient expresses concern regarding their immune system, noting only one week of good health before the onset of new symptoms. They deny experiencing dyspnea or chest pain but report minimal discomfort. The patient has been taking Zyrtec-D for allergy management. They have a history of using Augmentin for sinus infections, which they find effective. The patient typically experiences sinus infections biannually, usually in the spring and fall. They are uncertain if they have previously tried Singulair.    The patient has not yet initiated estrogen therapy. They were prescribed Levaquin in April 2025 for a urinary tract infection.        Current  Outpatient Medications   Medication Instructions    amoxicillin-clavulanate (AUGMENTIN) 875-125 MG per tablet 1 tablet, Oral, 2 Times Daily    cetirizine-pseudoephedrine (ZyrTEC-D) 5-120 MG per 12 hr tablet 1 tablet, Oral, 2 Times Daily    citalopram (CELEXA) 10 mg, Oral, Daily    dicyclomine (BENTYL) 10 MG capsule TAKE TWO CAPSULES BY MOUTH FOUR TIMES A DAY BEFORE MEALS AND AT BEDTIME FOR 30 DAYS    montelukast (SINGULAIR) 10 mg, Oral, Nightly    valACYclovir (VALTREX) 500 mg, 2 Times Daily       The following portions of the patient's history were reviewed and updated as appropriate: allergies, current medications, past family history, past medical history, past social history, past surgical history, and problem list.    Objective   Vital Signs:   There were no vitals taken for this visit.    Wt Readings from Last 3 Encounters:   05/14/25 85.3 kg (188 lb)   03/25/25 84.4 kg (186 lb)   12/11/24 87.1 kg (192 lb)     BP Readings from Last 3 Encounters:   05/14/25 110/47   03/25/25 119/79   02/17/25 130/77       Physical Exam   Virtual Visit Physical Exam  Gen: well-nourished, no acute distress  HENT: atraumatic, normocephalic  Eyes: extraocular movements intact, no scleral icterus  Lung: breathing comfortably, no cough  Neuro: grossly oriented to person, place, and time. no facial droop   Psych: normal mood and affect    Result Review :     Common labs          3/25/2025    14:10   Common Labs   Glucose 80    BUN 16    Creatinine 0.79    Sodium 142    Potassium 4.1    Chloride 103    Calcium 9.5    Albumin 4.3    Total Bilirubin 0.4    Alkaline Phosphatase 81    AST (SGOT) 24    ALT (SGPT) 22    WBC 4.19    Hemoglobin 14.2    Hematocrit 43.3    Platelets 330    Total Cholesterol 220    Triglycerides 49    HDL Cholesterol 73    LDL Cholesterol  139         Assessment and Plan    Diagnoses and all orders for this visit:    Diagnoses and all orders for this visit:    1. Acute non-recurrent maxillary sinusitis  (Primary)  -     amoxicillin-clavulanate (AUGMENTIN) 875-125 MG per tablet; Take 1 tablet by mouth 2 (Two) Times a Day for 7 days.  Dispense: 14 tablet; Refill: 0    2. Allergic rhinitis, unspecified seasonality, unspecified trigger  -     montelukast (Singulair) 10 MG tablet; Take 1 tablet by mouth Every Night.  Dispense: 90 tablet; Refill: 3        Medications Discontinued During This Encounter   Medication Reason    brompheniramine-pseudoephedrine-DM 30-2-10 MG/5ML syrup *Therapy completed        Follow Up   Return if symptoms worsen or fail to improve.  Patient was given instructions and counseling regarding his condition or for health maintenance advice. Please see specific information pulled into the AVS if appropriate.

## 2025-08-25 ENCOUNTER — TRANSCRIBE ORDERS (OUTPATIENT)
Dept: ADMINISTRATIVE | Facility: HOSPITAL | Age: 60
End: 2025-08-25
Payer: COMMERCIAL

## 2025-08-25 DIAGNOSIS — Z12.31 BREAST CANCER SCREENING BY MAMMOGRAM: Primary | ICD-10-CM

## 2025-08-29 DIAGNOSIS — F41.1 GAD (GENERALIZED ANXIETY DISORDER): ICD-10-CM

## 2025-08-29 DIAGNOSIS — R45.86 MOOD SWINGS: ICD-10-CM

## 2025-08-29 RX ORDER — CITALOPRAM HYDROBROMIDE 20 MG/1
10 TABLET ORAL DAILY
Qty: 45 TABLET | Refills: 2 | Status: SHIPPED | OUTPATIENT
Start: 2025-08-29 | End: 2025-08-29 | Stop reason: SDUPTHER

## (undated) DEVICE — EGD OR ERCP KIT: Brand: MEDLINE INDUSTRIES, INC.

## (undated) DEVICE — SOL IRRG H2O PL/BG 1000ML STRL

## (undated) DEVICE — SINGLE-USE BIOPSY FORCEPS: Brand: RADIAL JAW 4

## (undated) DEVICE — Device: Brand: DEFENDO AIR/WATER/SUCTION AND BIOPSY VALVE

## (undated) DEVICE — COLON KIT: Brand: MEDLINE INDUSTRIES, INC.